# Patient Record
Sex: MALE | Race: WHITE | NOT HISPANIC OR LATINO | Employment: OTHER | URBAN - METROPOLITAN AREA
[De-identification: names, ages, dates, MRNs, and addresses within clinical notes are randomized per-mention and may not be internally consistent; named-entity substitution may affect disease eponyms.]

---

## 2017-06-14 ENCOUNTER — TELEPHONE (OUTPATIENT)
Dept: DERMATOLOGY | Facility: CLINIC | Age: 82
End: 2017-06-14

## 2017-06-14 NOTE — TELEPHONE ENCOUNTER
----- Message from Savannah Novoa sent at 6/14/2017 11:52 AM CDT -----  Contact: patient wife  [phone] shannen [phone] call this number patient wants sooner appointment thanks

## 2017-07-14 ENCOUNTER — TELEPHONE (OUTPATIENT)
Dept: DERMATOLOGY | Facility: CLINIC | Age: 82
End: 2017-07-14

## 2017-07-14 ENCOUNTER — OFFICE VISIT (OUTPATIENT)
Dept: DERMATOLOGY | Facility: CLINIC | Age: 82
End: 2017-07-14
Payer: MEDICARE

## 2017-07-14 VITALS — BODY MASS INDEX: 28.29 KG/M2 | WEIGHT: 170 LBS

## 2017-07-14 DIAGNOSIS — Z85.828 HISTORY OF SKIN CANCER: ICD-10-CM

## 2017-07-14 DIAGNOSIS — D09.9 SQUAMOUS CELL CARCINOMA IN SITU: ICD-10-CM

## 2017-07-14 DIAGNOSIS — D48.5 NEOPLASM OF UNCERTAIN BEHAVIOR OF SKIN: ICD-10-CM

## 2017-07-14 DIAGNOSIS — L57.0 ACTINIC KERATOSIS: Primary | ICD-10-CM

## 2017-07-14 PROCEDURE — 99213 OFFICE O/P EST LOW 20 MIN: CPT | Mod: PBBFAC,PO | Performed by: DERMATOLOGY

## 2017-07-14 PROCEDURE — 1159F MED LIST DOCD IN RCRD: CPT | Mod: ,,, | Performed by: DERMATOLOGY

## 2017-07-14 PROCEDURE — 99999 PR PBB SHADOW E&M-EST. PATIENT-LVL III: CPT | Mod: PBBFAC,,, | Performed by: DERMATOLOGY

## 2017-07-14 PROCEDURE — 11100 PR BIOPSY OF SKIN LESION: CPT | Mod: S$PBB,,, | Performed by: DERMATOLOGY

## 2017-07-14 PROCEDURE — 99213 OFFICE O/P EST LOW 20 MIN: CPT | Mod: 25,S$PBB,, | Performed by: DERMATOLOGY

## 2017-07-14 PROCEDURE — 11100 PR BIOPSY OF SKIN LESION: CPT | Mod: PBBFAC,PO | Performed by: DERMATOLOGY

## 2017-07-14 PROCEDURE — 88305 TISSUE EXAM BY PATHOLOGIST: CPT | Performed by: PATHOLOGY

## 2017-07-14 NOTE — TELEPHONE ENCOUNTER
----- Message from Melissa Garcia sent at 7/14/2017 10:30 AM CDT -----  Contact: pts /Kayla SCOTT-pt- Kayla pts  is calling to speak with the nurse pt needs to be seen before 8-8 pt has a rash that's itching pt isn't comfortable pt needs to be seen asap can you please call Kayla  [phone]    PATRICIA

## 2017-07-15 NOTE — PROGRESS NOTES
Subjective:       Patient ID:  Franklin Perez is a 87 y.o. male who presents for   Chief Complaint   Patient presents with    Lesion     scalp     See previous notes had bx of scc mid scalp used efudex helped some then had PDT following tazorac cream, was much improved recently noted the area bleeding again not painful.   Also new lesion on right cheek.       Lesion         Review of Systems   Constitutional: Negative for fever.   Skin: Negative for itching and rash.   Hematologic/Lymphatic: Does not bruise/bleed easily.        Objective:    Physical Exam   Constitutional: He appears well-developed and well-nourished. No distress.   Neurological: He is alert and oriented to person, place, and time. He is not disoriented.   Psychiatric: He has a normal mood and affect.   Skin:   Areas Examined (abnormalities noted in diagram):   Scalp / Hair Palpated and Inspected  Head / Face Inspection Performed  Neck Inspection Performed  Chest / Axilla Inspection Performed  RUE Inspected  LUE Inspection Performed                   Diagram Legend     Erythematous scaling macule/papule c/w actinic keratosis         See annotation      Assessment / Plan:      Pathology Orders:      Normal Orders This Visit    Tissue Specimen To Pathology, Dermatology     Questions:    Directional Terms:  Other(comment)    Clinical information:  scc    Specific Site:  mid scalp        Actinic keratosis   Cryosurgery Procedure Note    Verbal consent from the patient is obtained and the patient is aware of the precancerous quality and need for treatment of these lesions. Liquid nitrogen cryosurgery is applied to the 1 actinic keratoses, as detailed in the physical exam, to produce a freeze injury.      Neoplasm of uncertain behavior of skin  -     Tissue Specimen To Pathology, Dermatology  Mid scalp  Shave biopsy performed after verbal consent including risk of infection, scar, recurrence, need for additional treatment of site. . Hemostasis achieved  with monsels. No complications. Dressing applied. Wound care explained. Will need further rx if + ca  Same area as previous photo  History of skin cancer  [facility]             Return in about 3 months (around 10/14/2017).

## 2017-07-31 ENCOUNTER — TELEPHONE (OUTPATIENT)
Dept: DERMATOLOGY | Facility: CLINIC | Age: 82
End: 2017-07-31

## 2017-07-31 NOTE — TELEPHONE ENCOUNTER
----- Message from Allyssa Damaris sent at 7/31/2017  9:25 AM CDT -----  Contact: Kayla at [phone] .  [facility] ptp-pts  is returning a call from last Thursday.  Needs another appt.  States that [facility] wants to see him again.  Please call asap.

## 2017-08-02 ENCOUNTER — INITIAL CONSULT (OUTPATIENT)
Dept: DERMATOLOGY | Facility: CLINIC | Age: 82
End: 2017-08-02
Payer: MEDICARE

## 2017-08-02 VITALS
HEART RATE: 72 BPM | DIASTOLIC BLOOD PRESSURE: 64 MMHG | BODY MASS INDEX: 28.32 KG/M2 | WEIGHT: 170 LBS | HEIGHT: 65 IN | SYSTOLIC BLOOD PRESSURE: 125 MMHG

## 2017-08-02 DIAGNOSIS — C44.42 SQUAMOUS CELL CARCINOMA, SCALP/NECK: Primary | ICD-10-CM

## 2017-08-02 PROCEDURE — 99214 OFFICE O/P EST MOD 30 MIN: CPT | Mod: S$PBB,,, | Performed by: DERMATOLOGY

## 2017-08-02 PROCEDURE — 1159F MED LIST DOCD IN RCRD: CPT | Mod: ,,, | Performed by: DERMATOLOGY

## 2017-08-02 PROCEDURE — 99999 PR PBB SHADOW E&M-EST. PATIENT-LVL III: CPT | Mod: PBBFAC,,, | Performed by: DERMATOLOGY

## 2017-08-02 PROCEDURE — 99213 OFFICE O/P EST LOW 20 MIN: CPT | Mod: PBBFAC | Performed by: DERMATOLOGY

## 2017-08-02 PROCEDURE — 1126F AMNT PAIN NOTED NONE PRSNT: CPT | Mod: ,,, | Performed by: DERMATOLOGY

## 2017-08-02 RX ORDER — AMOXICILLIN 500 MG
2 CAPSULE ORAL DAILY
COMMUNITY

## 2017-08-02 NOTE — PROGRESS NOTES
REFERRING MD:  Laure Cleary M.D.    CHIEF COMPLAINT:  Established patient being consulted for Mohs' surgery evaluation.    HISTORY OF PRESENT ILLNESS:  87 y.o. male presents with a 1-2 year(s) history of growth on the mid scalp. (+) painful. Treated scalp with Efudex and PDT in past and this has remained.     Positive for scabbing.  Positive for crusting.  Negative for bleeding.  Positive for itching.    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       Pacemaker: No  Defibrillator: No  Artificial joints: No  Artificial heart valves: No    PAST MEDICAL HISTORY:  Past Medical History:   Diagnosis Date    AK (actinic keratosis)     Anxiety     Basal cell carcinoma     Depression     Hypertension     Prostate cancer     Squamous cell carcinoma     on the right forehead       PAST SURGICAL HISTORY:  Past Surgical History:   Procedure Laterality Date    PROSTATECTOMY          SOCIAL HISTORY:  Dependencies:  never smoked    PERTINENT MEDICATIONS:  See medications list.  aspirin and fish oil    ALLERGIES:  Review of patient's allergies indicates no known allergies.    ROS:  Skin: See HPI  Constitutional: No fatigue, fever, malaise, weight loss, or night sweats.  Cardiovascular: No chest pain, palpitations, or edema.  Respiratory: No coughing, wheezing, SOB, or sputum production.    Physical Exam   HENT:   Head:             General: Mood and affect normal. Alert and orient X3. Normal appearance.  Scalp: Mid scalp with an 8 x 13 mm crusted plaque located 14.5 cm superiorly from the left superior ear attachment.   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    PLAN:  The diagnosis and the pathology report were discussed in detail with the patient. Treatment options were reviewed, including Mohs Micrographic Surgery, radiation, topical therapy, and  standard excision.  After careful review of patient's history and physical exam, and after discussion of treatment options, the decision was made to perform Mohs micrographic surgery.    Scheduled patient for Mohs Micrographic Surgery. Risks, benefits, and alternatives of Mohs' surgery discussed with the patient. Discussed repair options including complex closure, skin flap, skin graft and second intention healing with the patient. Pre-operative instructions provided to the patient. Okay to stay on aspirin. Stop fish oil a week prior to procedure.       Spent 30 minutes in coordination of care and/or consultation with patient discussing diagnosis, treatment options, risks and benefits of each. All questions answered.

## 2017-08-17 ENCOUNTER — PROCEDURE VISIT (OUTPATIENT)
Dept: DERMATOLOGY | Facility: CLINIC | Age: 82
End: 2017-08-17
Payer: MEDICARE

## 2017-08-17 VITALS
DIASTOLIC BLOOD PRESSURE: 77 MMHG | SYSTOLIC BLOOD PRESSURE: 143 MMHG | HEART RATE: 66 BPM | BODY MASS INDEX: 28.32 KG/M2 | HEIGHT: 65 IN | WEIGHT: 170 LBS

## 2017-08-17 DIAGNOSIS — D04.4 SQUAMOUS CELL CARCINOMA IN SITU OF SCALP: Primary | ICD-10-CM

## 2017-08-17 PROCEDURE — 17311 MOHS 1 STAGE H/N/HF/G: CPT | Mod: S$PBB,,, | Performed by: DERMATOLOGY

## 2017-08-17 PROCEDURE — 99499 UNLISTED E&M SERVICE: CPT | Mod: S$PBB,,, | Performed by: DERMATOLOGY

## 2017-08-17 PROCEDURE — 17312 MOHS ADDL STAGE: CPT | Mod: S$PBB,,, | Performed by: DERMATOLOGY

## 2017-08-17 PROCEDURE — 17312 MOHS ADDL STAGE: CPT | Mod: PBBFAC | Performed by: DERMATOLOGY

## 2017-08-17 PROCEDURE — 17311 MOHS 1 STAGE H/N/HF/G: CPT | Mod: PBBFAC | Performed by: DERMATOLOGY

## 2017-08-17 NOTE — PROGRESS NOTES
PROCEDURE: Mohs' Micrographic Surgery    INDICATION: Biopsy-proven skin cancer of cosmetically and functionally important areas, including head, neck, genital, hand, foot, or areas known for having difficulty in healing, such as the lower anterior legs. Tumors with aggressive clinical behavior (rapidly growing, greater than 1 cm in diameter). Tumor with ill-defined borders.    REFERRING MD: Laure Cleary M.D.    CASE NUMBER:     ANESTHETIC: 7 cc 0.5% Lidocaine with Epi 1:200,000 mixed 1:1 with 0.5% Bupivacaine    SURGICAL PREP: Hibiclens    SURGEON: Lion Fernandez MD    ASSISTANTS: Jud Billingsley, Surg Tech    PREOPERATIVE DIAGNOSIS: squamous cell carcinoma in situ    POSTOPERATIVE DIAGNOSIS: squamous cell carcinoma in situ    PATHOLOGIC DIAGNOSIS: squamous cell carcinoma in situ    HISTOLOGY OF SPECIMENS IN FIRST STAGE:   Tumor Type: Tumor seen. Squamous cell carcinoma in situ: Epidermis with full-thickness atypia and variable epidermal maturation.  Depth of Invasion: epidermis  Perineural Invasion: No    HISTOLOGY OF SPECIMENS IN SUBSEQUENT STAGES:  · Tumor Type: No tumor seen.    STAGES OF MOHS' SURGERY PERFORMED: 2    TUMOR-FREE PLANE ACHIEVED: Yes    HEMOSTASIS: electrocoagulation     SPECIMENS: 4 (2 in stage A and 2 in stage B)    LOCATION: mid scalp. Patient verified location.    INITIAL LESION SIZE: 1.0 x 1.5 cm    FINAL DEFECT SIZE: 1.7 x 2.0 cm    WOUND REPAIR/DISPOSITION: When the tumor was completely removed, repair options were discussed with the patient, and it was decided to let the wound heal by second intention. The patient tolerated the procedure well and will consider delayed reconstruction or repair if necessary.    The area was cleaned and dressed appropriately, and the patient was given wound care instructions, as well as an appointment for follow-up evaluation.    Vitals:    08/17/17 0747 08/17/17 1022   BP: (!) 134/59 (!) 143/77   BP Location: Right arm Left arm   Patient Position:  "Sitting Sitting   BP Method: Medium (Automatic) Medium (Automatic)   Pulse: 80 66   Weight: 77.1 kg (170 lb)    Height: 5' 5" (1.651 m)            "

## 2017-09-18 ENCOUNTER — OFFICE VISIT (OUTPATIENT)
Dept: DERMATOLOGY | Facility: CLINIC | Age: 82
End: 2017-09-18
Payer: MEDICARE

## 2017-09-18 DIAGNOSIS — D04.4 SQUAMOUS CELL CARCINOMA IN SITU OF SCALP: Primary | ICD-10-CM

## 2017-09-18 PROCEDURE — 1126F AMNT PAIN NOTED NONE PRSNT: CPT | Mod: ,,, | Performed by: DERMATOLOGY

## 2017-09-18 PROCEDURE — 99212 OFFICE O/P EST SF 10 MIN: CPT | Mod: S$PBB,,, | Performed by: DERMATOLOGY

## 2017-09-18 PROCEDURE — 99999 PR PBB SHADOW E&M-EST. PATIENT-LVL II: CPT | Mod: PBBFAC,,, | Performed by: DERMATOLOGY

## 2017-09-18 PROCEDURE — 1159F MED LIST DOCD IN RCRD: CPT | Mod: ,,, | Performed by: DERMATOLOGY

## 2017-09-18 PROCEDURE — 99212 OFFICE O/P EST SF 10 MIN: CPT | Mod: PBBFAC | Performed by: DERMATOLOGY

## 2017-09-18 NOTE — PROGRESS NOTES
87 y.o. male patient is here for wound check after surgery.    Patient reports no problems.    WOUND PE:  The mid scalp wound with 95% re-epithelialization.    IMPRESSION:  Healing operative site from Mohs' surgery SCC, mid scalp s/p Mohs' with 2nd intention healing, postop week # 5, now almost all healed in.     PLAN:   Keep moist with Auqaphor x 1 more week. No need to bandage.   Daily SPF.  Regular skin checks.    RTC:  In 3-6 months with Laure Cleary M.D. for skin check or sooner if new concern arises.

## 2017-09-29 ENCOUNTER — OFFICE VISIT (OUTPATIENT)
Dept: DERMATOLOGY | Facility: CLINIC | Age: 82
End: 2017-09-29
Payer: MEDICARE

## 2017-09-29 VITALS — BODY MASS INDEX: 28.29 KG/M2 | WEIGHT: 170 LBS

## 2017-09-29 DIAGNOSIS — Z85.828 HISTORY OF SKIN CANCER: ICD-10-CM

## 2017-09-29 DIAGNOSIS — L57.0 MULTIPLE ACTINIC KERATOSES: Primary | ICD-10-CM

## 2017-09-29 DIAGNOSIS — L82.1 SEBORRHEIC KERATOSES: ICD-10-CM

## 2017-09-29 PROCEDURE — 1159F MED LIST DOCD IN RCRD: CPT | Mod: ,,, | Performed by: DERMATOLOGY

## 2017-09-29 PROCEDURE — 99212 OFFICE O/P EST SF 10 MIN: CPT | Mod: PBBFAC,PO | Performed by: DERMATOLOGY

## 2017-09-29 PROCEDURE — 17003 DESTRUCT PREMALG LES 2-14: CPT | Mod: S$PBB,,, | Performed by: DERMATOLOGY

## 2017-09-29 PROCEDURE — 99213 OFFICE O/P EST LOW 20 MIN: CPT | Mod: 25,S$PBB,, | Performed by: DERMATOLOGY

## 2017-09-29 PROCEDURE — 17000 DESTRUCT PREMALG LESION: CPT | Mod: S$PBB,,, | Performed by: DERMATOLOGY

## 2017-09-29 PROCEDURE — 99999 PR PBB SHADOW E&M-EST. PATIENT-LVL II: CPT | Mod: PBBFAC,,, | Performed by: DERMATOLOGY

## 2017-09-29 PROCEDURE — 17000 DESTRUCT PREMALG LESION: CPT | Mod: PBBFAC,PO | Performed by: DERMATOLOGY

## 2017-09-29 PROCEDURE — 1126F AMNT PAIN NOTED NONE PRSNT: CPT | Mod: ,,, | Performed by: DERMATOLOGY

## 2017-09-29 PROCEDURE — 17003 DESTRUCT PREMALG LES 2-14: CPT | Mod: PBBFAC,PO | Performed by: DERMATOLOGY

## 2017-09-29 NOTE — PROGRESS NOTES
Subjective:       Patient ID:  Franklin Perez is a 87 y.o. male who presents for   Chief Complaint   Patient presents with    Follow-up     SCC  on scalp     Lesion     History of Present Illness: The patient presents with chief complaint of spots.  Location: post scalp  Duration: weeks  Signs/Symptoms: none    Prior treatments: none              Review of Systems   Constitutional: Negative for fever.   Skin: Negative for itching and rash.   Hematologic/Lymphatic: Does not bruise/bleed easily.        Objective:    Physical Exam   Constitutional: He appears well-developed and well-nourished. No distress.   Neurological: He is alert and oriented to person, place, and time. He is not disoriented.   Psychiatric: He has a normal mood and affect.   Skin:   Areas Examined (abnormalities noted in diagram):   Scalp / Hair Palpated and Inspected  Head / Face Inspection Performed  Neck Inspection Performed  Chest / Axilla Inspection Performed  RUE Inspected  LUE Inspection Performed                   Diagram Legend     Erythematous scaling macule/papule c/w actinic keratosis         Pigmented verrucoid papule/plaque c/w seborrheic keratosis        Surgical scar with no sign of skin cancer recurrence       Assessment / Plan:        Multiple actinic keratoses   Cryosurgery Procedure Note    Verbal consent from the patient is obtained and the patient is aware of the precancerous quality and need for treatment of these lesions. Liquid nitrogen cryosurgery is applied to the 7 actinic keratoses, as detailed in the physical exam, to produce a freeze injury.      History of skin cancer  [facility] most recently on scalp see notes Dr Fernandez    Seborrheic keratoses  reassurance               Return in about 3 months (around 12/29/2017).

## 2017-12-05 ENCOUNTER — OFFICE VISIT (OUTPATIENT)
Dept: DERMATOLOGY | Facility: CLINIC | Age: 82
End: 2017-12-05
Payer: MEDICARE

## 2017-12-05 VITALS — WEIGHT: 170 LBS | BODY MASS INDEX: 28.29 KG/M2

## 2017-12-05 DIAGNOSIS — Z85.828 HISTORY OF SKIN CANCER: ICD-10-CM

## 2017-12-05 DIAGNOSIS — D48.5 NEOPLASM OF UNCERTAIN BEHAVIOR OF SKIN: ICD-10-CM

## 2017-12-05 DIAGNOSIS — L08.0 PYODERMA: Primary | ICD-10-CM

## 2017-12-05 PROCEDURE — 11100 PR BIOPSY OF SKIN LESION: CPT | Mod: S$PBB,,, | Performed by: DERMATOLOGY

## 2017-12-05 PROCEDURE — 99213 OFFICE O/P EST LOW 20 MIN: CPT | Mod: 25,S$PBB,, | Performed by: DERMATOLOGY

## 2017-12-05 PROCEDURE — 99999 PR PBB SHADOW E&M-EST. PATIENT-LVL III: CPT | Mod: PBBFAC,,, | Performed by: DERMATOLOGY

## 2017-12-05 PROCEDURE — 99213 OFFICE O/P EST LOW 20 MIN: CPT | Mod: PBBFAC,PO | Performed by: DERMATOLOGY

## 2017-12-05 PROCEDURE — 87070 CULTURE OTHR SPECIMN AEROBIC: CPT

## 2017-12-05 PROCEDURE — 11100 PR BIOPSY OF SKIN LESION: CPT | Mod: PBBFAC,PO | Performed by: DERMATOLOGY

## 2017-12-05 PROCEDURE — 88305 TISSUE EXAM BY PATHOLOGIST: CPT | Performed by: PATHOLOGY

## 2017-12-05 PROCEDURE — 88342 IMHCHEM/IMCYTCHM 1ST ANTB: CPT | Mod: 26,,, | Performed by: PATHOLOGY

## 2017-12-05 RX ORDER — ALISKIREN HEMIFUMARATE 150 MG/1
150 TABLET, FILM COATED ORAL 2 TIMES DAILY
COMMUNITY
Start: 2017-12-04

## 2017-12-05 RX ORDER — TRAMADOL HYDROCHLORIDE 50 MG/1
TABLET ORAL
COMMUNITY
Start: 2017-11-16

## 2017-12-05 RX ORDER — MECLIZINE HYDROCHLORIDE 25 MG/1
TABLET ORAL
COMMUNITY
Start: 2017-12-01

## 2017-12-07 LAB — BACTERIA SPEC AEROBE CULT: NORMAL

## 2018-08-27 ENCOUNTER — OFFICE VISIT (OUTPATIENT)
Dept: DERMATOLOGY | Facility: CLINIC | Age: 83
End: 2018-08-27
Payer: MEDICARE

## 2018-08-27 VITALS — BODY MASS INDEX: 28.29 KG/M2 | WEIGHT: 170 LBS

## 2018-08-27 DIAGNOSIS — D48.5 NEOPLASM OF UNCERTAIN BEHAVIOR OF SKIN: Primary | ICD-10-CM

## 2018-08-27 PROCEDURE — 11100 PR BIOPSY OF SKIN LESION: CPT | Mod: S$PBB,,, | Performed by: DERMATOLOGY

## 2018-08-27 PROCEDURE — 99999 PR PBB SHADOW E&M-EST. PATIENT-LVL III: CPT | Mod: PBBFAC,,, | Performed by: DERMATOLOGY

## 2018-08-27 PROCEDURE — 99213 OFFICE O/P EST LOW 20 MIN: CPT | Mod: 25,S$PBB,, | Performed by: DERMATOLOGY

## 2018-08-27 PROCEDURE — 11101 PR BIOPSY, EACH ADDED LESION: CPT | Mod: PBBFAC,PO | Performed by: DERMATOLOGY

## 2018-08-27 PROCEDURE — 88312 SPECIAL STAINS GROUP 1: CPT | Mod: 26,,, | Performed by: PATHOLOGY

## 2018-08-27 PROCEDURE — 11100 PR BIOPSY OF SKIN LESION: CPT | Mod: PBBFAC,PO | Performed by: DERMATOLOGY

## 2018-08-27 PROCEDURE — 88342 IMHCHEM/IMCYTCHM 1ST ANTB: CPT | Mod: 26,,, | Performed by: PATHOLOGY

## 2018-08-27 PROCEDURE — 99213 OFFICE O/P EST LOW 20 MIN: CPT | Mod: PBBFAC,PO,25 | Performed by: DERMATOLOGY

## 2018-08-27 PROCEDURE — 11101 PR BIOPSY, EACH ADDED LESION: CPT | Mod: S$PBB,,, | Performed by: DERMATOLOGY

## 2018-08-27 PROCEDURE — 88305 TISSUE EXAM BY PATHOLOGIST: CPT | Mod: 59 | Performed by: PATHOLOGY

## 2018-08-27 NOTE — PROGRESS NOTES
Subjective:       Patient ID:  Franklin Perez is a 88 y.o. male who presents for   Chief Complaint   Patient presents with    Follow-up     scalp     History of Present Illness: The patient presents with chief complaint of spots.  Location: scalp  Duration: several months  Signs/Symptoms: crusted    Prior treatments: none              Review of Systems   Constitutional: Negative for fever.   Skin: Negative for itching and rash.   Hematologic/Lymphatic: Does not bruise/bleed easily.        Objective:    Physical Exam   Constitutional: He appears well-developed and well-nourished. No distress.   Neurological: He is alert and oriented to person, place, and time. He is not disoriented.   Psychiatric: He has a normal mood and affect.   Skin:   Areas Examined (abnormalities noted in diagram):   Scalp / Hair Palpated and Inspected  Head / Face Inspection Performed  Neck Inspection Performed  Chest / Axilla Inspection Performed  RUE Inspected  LUE Inspection Performed                   Diagram Legend     Erythematous scaling macule/papule c/w actinic keratosis       Vascular papule c/w angioma      Pigmented verrucoid papule/plaque c/w seborrheic keratosis      Yellow umbilicated papule c/w sebaceous hyperplasia      Irregularly shaped tan macule c/w lentigo     1-2 mm smooth white papules consistent with Milia      Movable subcutaneous cyst with punctum c/w epidermal inclusion cyst      Subcutaneous movable cyst c/w pilar cyst      Firm pink to brown papule c/w dermatofibroma      Pedunculated fleshy papule(s) c/w skin tag(s)      Evenly pigmented macule c/w junctional nevus     Mildly variegated pigmented, slightly irregular-bordered macule c/w mildly atypical nevus      Flesh colored to evenly pigmented papule c/w intradermal nevus       Pink pearly papule/plaque c/w basal cell carcinoma      Erythematous hyperkeratotic cursted plaque c/w SCC      Surgical scar with no sign of skin cancer recurrence      Open and  closed comedones      Inflammatory papules and pustules      Verrucoid papule consistent consistent with wart     Erythematous eczematous patches and plaques     Dystrophic onycholytic nail with subungual debris c/w onychomycosis     Umbilicated papule    Erythematous-base heme-crusted tan verrucoid plaque consistent with inflamed seborrheic keratosis     Erythematous Silvery Scaling Plaque c/w Psoriasis     See annotation      Assessment / Plan:      Pathology Orders:     Normal Orders This Visit    Tissue Specimen To Pathology, Dermatology     Questions:    Directional Terms:  Other(comment)    Clinical information:  actinic keratosis    Specific Site:  anterior scalp    Tissue Specimen To Pathology, Dermatology     Questions:    Directional Terms:  Other(comment)    Clinical information:  actinic keratosis    Specific Site:  post scalp        Neoplasm of uncertain behavior of skin  -     Tissue Specimen To Pathology, Dermatology  -     Tissue Specimen To Pathology, Dermatology  Anterior scalp  Shave biopsy performed after verbal consent including risk of infection, scar, recurrence, need for additional treatment of site. Area prepped with alcohol, anesthetized with 1% lidocaine with epinephrine. . Hemostasis achieved with  monsels. No complications. Dressing applied. Wound care explained. Will need further rx if + ca        Shave biopsy performed after verbal consent including risk of infection, scar, recurrence, need for additional treatment of site. Area prepped with alcohol, anesthetized with  1% lidocaine with epinephrine. . Hemostasis achieved with monsels. No complications. Dressing applied. Wound care explained. Will need further rx if + ca    Also has hyperkeratosis of scalp, will try neutrogena sal shampoo.         Follow-up in about 6 months (around 2/27/2019).

## 2018-09-13 ENCOUNTER — TELEPHONE (OUTPATIENT)
Dept: DERMATOLOGY | Facility: CLINIC | Age: 83
End: 2018-09-13

## 2018-09-20 ENCOUNTER — INITIAL CONSULT (OUTPATIENT)
Dept: DERMATOLOGY | Facility: CLINIC | Age: 83
End: 2018-09-20
Payer: MEDICARE

## 2018-09-20 VITALS — HEART RATE: 79 BPM | SYSTOLIC BLOOD PRESSURE: 120 MMHG | DIASTOLIC BLOOD PRESSURE: 62 MMHG

## 2018-09-20 DIAGNOSIS — C44.41 BASAL CELL CARCINOMA OF SCALP: Primary | ICD-10-CM

## 2018-09-20 PROCEDURE — 99214 OFFICE O/P EST MOD 30 MIN: CPT | Mod: S$PBB,,, | Performed by: DERMATOLOGY

## 2018-09-20 PROCEDURE — 99999 PR PBB SHADOW E&M-EST. PATIENT-LVL III: CPT | Mod: PBBFAC,,, | Performed by: DERMATOLOGY

## 2018-09-20 PROCEDURE — 99213 OFFICE O/P EST LOW 20 MIN: CPT | Mod: PBBFAC | Performed by: DERMATOLOGY

## 2018-09-20 NOTE — LETTER
September 20, 2018      Laure Cleary MD  [address] [address]           [facility]  [address]  [facility] [address]  Phone: [phone]  Fax: [phone]          Patient: Franklin Perez   MR Number: [id number]   YOB: 1930   Date of Visit: 9/20/2018       Dear Dr. Laure Cleary:    Thank you for referring Franklin Perez to me for evaluation. Attached you will find relevant portions of my assessment and plan of care.    If you have questions, please do not hesitate to call me. I look forward to following Franklin Perez along with you.    Sincerely,    Sean Mcdaniel MD    Enclosure  CC:  No Recipients    If you would like to receive this communication electronically, please contact [email or web address] or [phone] to request more information on [employer] access.    For providers and/or their staff who would like to refer a patient to [facility], please contact us through our one-stop-shop provider referral line, [facility], at [phone].    If you feel you have received this communication in error or would no longer like to receive these types of communications, please e-mail [email or web address]

## 2018-09-20 NOTE — PROGRESS NOTES
ALLERGIES:  Patient has no known allergies.    CHIEF COMPLAINT:  This 88 y.o. male comes for evaluation for Mohs' Micrographic Surgery, Fresh Tissue Technique, for treatment of a biopsy-proven basal cell carcinoma on the posterior scalp. Consultation requested by Laure Cleary MD.    The patient is accompanied to this visit by his daughter.    HISTORY OF PRESENT ILLNESS:   Location: posterior scalp  Duration: unknown  Quality: possibly recurrent  Context: status post biopsy by Laure Cleary MD; path = basal cell carcinoma; pathology accession #[id number], [facility]     Prior Treatment: unknown  See also the handwritten notes/diagrams scanned to chart for additional details.    Defibrillator: No  Pacemaker: No  Artificial heart valves: No  Artificial joints: No    REVIEW OF SYSTEMS:   General: general health good  Skin: has previous history of skin cancer(s)  CV: has hypertension, no artificial valves, has no chest pain  Resp: has shortness of breath  Endo: has no diabetes  Hem/Lymph: taking prescribed anticoagulants-ASPIRIN, has no easy bruising/bleeding  Allergy/Immuno: has no allergies as noted above  GI: has no history of hepatitis  MS: as noted above     PAST MEDICAL HISTORY:  Past Medical History:   Diagnosis Date    AK (actinic keratosis)     Anxiety     Basal cell carcinoma     Depression     Hypertension     Prostate cancer     Squamous cell carcinoma     on the right forehead       PAST SURGICAL HISTORY:  Past Surgical History:   Procedure Laterality Date    PROSTATECTOMY          SOCIAL HISTORY:  Dependencies: smoking status as noted below  Social History     Tobacco Use    Smoking status: Never Smoker    Smokeless tobacco: Never Used   Substance Use Topics    Alcohol use: Not on file    Drug use: Not on file       PERTINENT MEDICATIONS:  See medications list    Current Outpatient Medications:     aspirin (ECOTRIN) 81 MG EC tablet, Take 81 mg by mouth once daily., Disp: , Rfl:      BYSTOLIC 10 mg Tab, , Disp: , Rfl: 0    dicyclomine (BENTYL) 20 mg tablet, , Disp: , Rfl: 0    fish oil-omega-3 fatty acids 300-1,000 mg capsule, Take 2 g by mouth once daily., Disp: , Rfl:     ginkgo biloba 60 mg Tab, Take by mouth., Disp: , Rfl:     hydrALAZINE (APRESOLINE) 50 MG tablet, , Disp: , Rfl:     lorazepam (ATIVAN) 0.5 MG tablet, , Disp: , Rfl:     meclizine (ANTIVERT) 25 mg tablet, , Disp: , Rfl:     multivitamin (THERAGRAN) tablet, Take 1 tablet by mouth once daily., Disp: , Rfl:     multivitamin capsule, Take 1 capsule by mouth once daily., Disp: , Rfl:     spironolactone (ALDACTONE) 50 MG tablet, , Disp: , Rfl:     TEKTURNA 150 mg Tab, , Disp: , Rfl:     TEKTURNA 300 mg Tab, , Disp: , Rfl:     timolol maleate 0.5% (TIMOPTIC-XE) 0.5 % SolG, Place 1 drop into both eyes nightly., Disp: , Rfl: 0    traMADol (ULTRAM) 50 mg tablet, , Disp: , Rfl:     tazarotene (TAZORAC) 0.05 % Crea cream, Apply topically once daily. Use on scalp for 2 days before treatment with light( PDT), Disp: 30 g, Rfl: 3    ALLERGIES:  Patient has no known allergies.    EXAM:  See also the handwritten notes/diagrams scanned to chart for additional details.  Constitutional  General appearance: well-developed, well-nourished, well-kempt older white male    Eyes  Inspection of conjunctivae and lids reveals no abnormalities; sclerae anicteric  Neurologic/Psychiatric  Alert,  normal orientation to time, place, person  Normal mood and affect with no evidence of depression, anxiety, agitation  Skin: see photo(s)  Head: background marked solar damage to exposed areas of skin; in addition, inspection/palpation reveals an approximately 1.5 cm eroded plaque on the left posterior scalp; site(s) confirmed by reference to the photograph(s) in the chart taken at the time of the biopsy/biopsies by the referring physician and he confirmed this as the site of the prior biopsy  Neck: examination reveals marked chronic solar damage  Right  upper extremity: examination reveals marked chronic solar damage  Left upper extremity: examination reveals marked chronic solar damage    ASSESSMENT: biopsy-proven basal cell carcinoma of the left posterior scal  chronic solar damage to areas as noted above  personal history of non-melanoma skin cancer  Long term current use of aspirin    PLAN:  The diagnosis and management options, and risks and benefits of the alternatives, including observation/non-treatment, radiation treatment, excision with vertical frozen section or paraffin-embedded section margin evaluation, and Mohs' Micrographic Surgery, Fresh Tissue Technique, were discussed at length with the patient. In particular, the discussion included, but was not limited to, the following:    One alternative at this point would be to defer further treatment and observe the lesion. With small skin cancers of this kind, it is possible that a biopsy can be sufficient to definitively treat a small skin cancer of this kind. Alternatively, some skin cancers are slow growing and do not require immediate treatment. The potential advantage of this choice would be to avoid the need for possibly unnecessary additional surgery. Among the potential disadvantages of this would be the possibility of enlargement of the lesion, more extensive spread of the lesion or recurrence at a later date, which might necessitate a larger and more complex surgery.    Radiation treatment can be an effective treatment for this type of skin cancer. The usual course of treatment is every weekday for several weeks. Local irritation will result from treatment, although no systemic side effects are expected. The potential advantage of radiation treatment is that it avoids the need for surgery. Among the disadvantages of radiation treatment are the length of treatment, the local inflammatory response, the absence of pathologic confirmation of the removal of the skin cancer, a possible increased risk  of additional skin cancer in the treated area in later years, and a somewhat increased risk of recurrence at a later date.     Excisional surgery can be an effective treatment for this type of skin cancer. This would involve excision of the lesion with margin evaluation by submitting the specimen to a pathologist for either immediate marginal assessment via frozen section processing, or delayed marginal assessment by fixed-tissue processing. The potential advantage of this technique is that it offers a way of treating the lesion with some degree of histologic confirmation of tumor removal. Among the disadvantages of this treatment are the possible need for re-excision if marginal involvement is identified, a somewhat greater likelihood of recurrence as compared to Mohs' surgery because of the less comprehensive margin evaluation inherent in the technique, and the general potential risks of surgery, including allergic reactions to the anesthetic and other materials used, infection, injury to nerves in the area with consequent loss of sensation or muscle function, and scarring or distortion of surrounding structures.    Mohs' surgery is a very effective treatment for this type of skin cancer. The potential advantage of Mohs' surgery is that this technique offers the greatest possible certainty of knowing that the skin cancer has been completely removed, with the removal of the least amount of normal tissue. The potential disadvantages of Mohs' surgery include the duration of the surgery, the possible need for a separate surgery for reconstruction following tumor removal, and scarring as a result. In addition, general potential risks of surgery as noted above also apply to treatment via Mohs' surgery.    In light of the nature of this tumor and the location on the scalp in an area of increased risk of recurrence,  Mohs' micrographic surgery was thought to be the most appropriate management choice, and this diagnosis is  appropriate for treatment by Mohs' micrographic surgery.     Sufficient time was available for questions, and all questions were answered to his satisfaction. He fully understands the aims, risks, alternatives, and possible complications, and has elected to proceed with the surgery, and verbally consented to do so. The procedure will be scheduled in the near future.    Routine pre-op instructions were given to him.    I instructed him to continue ASA prior to surgery.    --------------------------------------  Note: Some or all of this note may have been generated using voice recognition software. There may be voice recognition errors including grammatical and/or spelling errors found in the text. Attempts were made to correct these errors prior to signature.

## 2018-11-10 NOTE — PROGRESS NOTES
ALLERGIES:   Patient has no known allergies.      Current Outpatient Medications:     aspirin (ECOTRIN) 81 MG EC tablet, Take 81 mg by mouth once daily., Disp: , Rfl:     BYSTOLIC 10 mg Tab, , Disp: , Rfl: 0    dicyclomine (BENTYL) 20 mg tablet, , Disp: , Rfl: 0    fish oil-omega-3 fatty acids 300-1,000 mg capsule, Take 2 g by mouth once daily., Disp: , Rfl:     ginkgo biloba 60 mg Tab, Take by mouth., Disp: , Rfl:     hydrALAZINE (APRESOLINE) 50 MG tablet, , Disp: , Rfl:     lorazepam (ATIVAN) 0.5 MG tablet, , Disp: , Rfl:     meclizine (ANTIVERT) 25 mg tablet, , Disp: , Rfl:     multivitamin (THERAGRAN) tablet, Take 1 tablet by mouth once daily., Disp: , Rfl:     multivitamin capsule, Take 1 capsule by mouth once daily., Disp: , Rfl:     spironolactone (ALDACTONE) 50 MG tablet, , Disp: , Rfl:     tazarotene (TAZORAC) 0.05 % Crea cream, Apply topically once daily. Use on scalp for 2 days before treatment with light( PDT), Disp: 30 g, Rfl: 3    TEKTURNA 150 mg Tab, , Disp: , Rfl:     TEKTURNA 300 mg Tab, , Disp: , Rfl:     timolol maleate 0.5% (TIMOPTIC-XE) 0.5 % SolG, Place 1 drop into both eyes nightly., Disp: , Rfl: 0    traMADol (ULTRAM) 50 mg tablet, , Disp: , Rfl:   -------------------------------------------------------------  PROCEDURE: Mohs' Micrographic Surgery    SITE: posterior scalp    INDICATION: basal cell carcinoma in an area at increased risk of recurrence    CASE NUMBER: [id number]      ANESTHETIC: 3.5 mL 1% Lidocaine with Epinephrine 1:100,000    SURGICAL PREP: Ethanol and Hibiclens    SURGEON: Sean Mcdaniel MD    ASSISTANTS: Abdirashid Pringle CST     PREOPERATIVE DIAGNOSIS: basal cell carcinoma    POSTOPERATIVE DIAGNOSIS: basal cell carcinoma    PATHOLOGIC DIAGNOSIS: basal cell carcinoma    STAGES OF MOHS' SURGERY PERFORMED: one    TUMOR-FREE PLANE ACHIEVED: yes    HEMOSTASIS: Hyfrecation     SPECIMENS: two (two in stage A)    INITIAL LESION SIZE: 2.0 x 2.5 cm    FINAL DEFECT  SIZE: 2.0 x 2.5 cm    WOUND REPAIR/DISPOSITION: see below    NARRATIVE:    The patient is a 88 y.o.male referred by Laure Cleary MD with a history of cancer on the left posterior scalp which was biopsied - pathology accession #[id number], [facility]. Findings revealed basal cell carcinoma. Examination revealed an ill-defined, pink, pearly/crusted plaque on the left posterior scalp vertex at the site of prior biopsy, which was confirmed by reference to the photograph taken at the previous patient visit. In light of the nature of this tumor and the location on the scalp, Mohs' micrographic surgery was thought to be the most appropriate management choice, and this diagnosis is appropriate for treatment by Mohs' micrographic surgery.  I discussed it with the patient and he fully understands the aims, risks, alternatives, and possible complications, and elects to proceed.  There are no medical or surgical contraindications to the procedure.     A signed informed consent was obtained.    PROCEDURE:  The patient was placed in the right lateral decubitus position on the operating table in the Mohs' Surgery Suite. The area in question was thoroughly prepped with ethanol and Hibiclens. A sterile surgical marker was used to outline the clinically apparent margins of the involved area, and a narrow margin of normal-appearing skin. Reference marks were made at the periphery of the outlined area with the surgical marker. The proposed area of excision was measured and photographed. Local anesthesia as noted above was administered.  The total volume of anesthetic used throughout this portion of the procedure was as documented above. The area was prepared and draped in the standard manner. All of the grossly identifiable area of clinically abnormal tissue and an underlying/peripheral layer was taken and processed by the Mohs' technique.  Hemostasis was obtained with the hyfrecator. Tissue was taken from any areas of residual  "marginal involvement (if present) and processed by the Mohs' technique in as many stages as needed until a tumor-free plane was achieved.    Colors of inks used in the reference nicks at epidermal margins (if present) and/or inking of non-epithelial edges, if applicable, is represented on the Mohs map as follows: solid lines represent red ink, dots represent blue ink, jagged lines represent black ink, curlicues represent green ink, "xxx" represents yellow ink.    The first Mohs' layer consisted of two section(s) with 4 slide(s) evaluated. No residual tumor was noted at the margins of the first Mohs' layer. Histology of the specimen(s) showed changes consistent with chronic solar damage.    A total of two section(s) and 4 slide(s) were examined under the microscope via the Mohs technique.  A cancer free plane was reached after layer number one. Defect final size was as noted above.      The wound was covered with a nonadherent dressing between stages, and the patient allowed to wait in the waiting area during these periods. The final defect was photographed at the completion of the Mohs' procedure.    The patient was returned to the procedure room following completion of the Mohs' procedure and final slide review. After reviewing the risk and benefits of the alternatives for management of the defect, the patient and I have decided to allow the site to heal by secondary intention.    Final dressing consisted of petrolatum, Telfa, gauze and tape.    Estimated blood loss for the total procedure was less than 5 mL.    Total operative time including tissue processing in the Mohs' laboratory and microscopic Mohs' frozen section slide review was 1 hour(s). Verbal and written wound care instructions were given to the patient, and he expressed understanding of these instructions. The patient tolerated the procedure well and left the operating room in good condition; he is to return in 2 weeks for followup.     Dr. Mcdaniel's " cell phone number was given to the patient with instructions to call prn with any problems.

## 2018-11-12 ENCOUNTER — PROCEDURE VISIT (OUTPATIENT)
Dept: DERMATOLOGY | Facility: CLINIC | Age: 83
End: 2018-11-12
Payer: MEDICARE

## 2018-11-12 VITALS
SYSTOLIC BLOOD PRESSURE: 153 MMHG | HEART RATE: 78 BPM | DIASTOLIC BLOOD PRESSURE: 68 MMHG | HEIGHT: 62 IN | WEIGHT: 170 LBS | BODY MASS INDEX: 31.28 KG/M2

## 2018-11-12 DIAGNOSIS — C44.41 BASAL CELL CARCINOMA OF SCALP: Primary | ICD-10-CM

## 2018-11-12 PROCEDURE — 17311 MOHS 1 STAGE H/N/HF/G: CPT | Mod: S$PBB,,, | Performed by: DERMATOLOGY

## 2018-11-12 PROCEDURE — 99499 UNLISTED E&M SERVICE: CPT | Mod: S$PBB,,, | Performed by: DERMATOLOGY

## 2018-11-12 PROCEDURE — 17311 MOHS 1 STAGE H/N/HF/G: CPT | Mod: PBBFAC | Performed by: DERMATOLOGY

## 2018-11-26 ENCOUNTER — OFFICE VISIT (OUTPATIENT)
Dept: DERMATOLOGY | Facility: CLINIC | Age: 83
End: 2018-11-26
Payer: MEDICARE

## 2018-11-26 DIAGNOSIS — Z85.828 HISTORY OF MOH'S MICROGRAPHIC SURGERY FOR SKIN CANCER: Primary | ICD-10-CM

## 2018-11-26 DIAGNOSIS — Z98.890 HISTORY OF MOH'S MICROGRAPHIC SURGERY FOR SKIN CANCER: Primary | ICD-10-CM

## 2018-11-26 PROCEDURE — 99999 PR PBB SHADOW E&M-EST. PATIENT-LVL II: CPT | Mod: PBBFAC,,, | Performed by: DERMATOLOGY

## 2018-11-26 PROCEDURE — 99024 POSTOP FOLLOW-UP VISIT: CPT | Mod: POP,,, | Performed by: DERMATOLOGY

## 2018-11-26 PROCEDURE — 99212 OFFICE O/P EST SF 10 MIN: CPT | Mod: PBBFAC | Performed by: DERMATOLOGY

## 2018-11-26 NOTE — PROGRESS NOTES
CC: 88 y.o.male patient is here for followup     HPI: Patient is 2 week(s) s/p Mohs' micrographic surgery, fresh tissue technique, of a basal cell carcinoma on the scalp; with healing via secondary intention  Patient reports no problems    EXAM: Wound appears to be healing well. Base shows good granulation.  No undue erythema to surrounding skin or signs or symptoms of infection.    IMPRESSION:  Healing well post Mohs' micrographic surgery via secondary intention    PLAN:  Discussed current findings  Continue current care  Reviewed anticipated course  Followup 4 weeks; call prn sooner

## 2019-01-07 ENCOUNTER — OFFICE VISIT (OUTPATIENT)
Dept: DERMATOLOGY | Facility: CLINIC | Age: 84
End: 2019-01-07
Payer: MEDICARE

## 2019-01-07 DIAGNOSIS — Z98.890 HISTORY OF MOH'S MICROGRAPHIC SURGERY FOR SKIN CANCER: Primary | ICD-10-CM

## 2019-01-07 DIAGNOSIS — Z85.828 HISTORY OF MOH'S MICROGRAPHIC SURGERY FOR SKIN CANCER: Primary | ICD-10-CM

## 2019-01-07 PROCEDURE — 99024 POSTOP FOLLOW-UP VISIT: CPT | Mod: POP,,, | Performed by: DERMATOLOGY

## 2019-01-07 PROCEDURE — 99024 PR POST-OP FOLLOW-UP VISIT: ICD-10-PCS | Mod: POP,,, | Performed by: DERMATOLOGY

## 2019-01-07 PROCEDURE — 99211 OFF/OP EST MAY X REQ PHY/QHP: CPT | Mod: PBBFAC | Performed by: DERMATOLOGY

## 2019-01-07 PROCEDURE — 99999 PR PBB SHADOW E&M-EST. PATIENT-LVL I: CPT | Mod: PBBFAC,,, | Performed by: DERMATOLOGY

## 2019-01-07 PROCEDURE — 99999 PR PBB SHADOW E&M-EST. PATIENT-LVL I: ICD-10-PCS | Mod: PBBFAC,,, | Performed by: DERMATOLOGY

## 2019-01-07 NOTE — PROGRESS NOTES
CC: 88 y.o.male patient is here for followup     HPI: Patient is about 7 week(s) s/p Mohs' micrographic surgery, fresh tissue technique, of a basal cell carcinoma on the posterior scalp; with healing via secondary intention  Patient reports no problems  Continues topical care    ROS:   Also status post biopsy, anterior scalp, by Dr. Cleary in August; path showed no signs of malignancy; see path in chart  Also continues topical care to this site    EXAM: Wound appears to be healed, or almost completely so.  No undue erythema to surrounding skin or signs or symptoms of infection.  Right anterior scalp site is also somewhat erythematous but appears epithelialized.    IMPRESSION:  Healing well post Mohs' micrographic surgery via secondary intention    PLAN:  Site cleaned with peroxide  Dressed with petrolatum and Telfa and tape  Discussed further care and expected course   Okay to discontinue ointment and leave the sites uncovered  Reviewed anticipated course  Followup 2-3 months to Dr. Cleary; PRN to me

## 2019-08-12 ENCOUNTER — TELEPHONE (OUTPATIENT)
Dept: DERMATOLOGY | Facility: CLINIC | Age: 84
End: 2019-08-12

## 2019-08-12 NOTE — TELEPHONE ENCOUNTER
----- Message from Freya Rodriguez sent at 8/12/2019  3:06 PM CDT -----  Contact: Kayla () @ [phone]  Returning Savannah's call.

## 2019-08-20 ENCOUNTER — OFFICE VISIT (OUTPATIENT)
Dept: DERMATOLOGY | Facility: CLINIC | Age: 84
End: 2019-08-20
Payer: MEDICARE

## 2019-08-20 VITALS — BODY MASS INDEX: 31.09 KG/M2 | WEIGHT: 170 LBS

## 2019-08-20 DIAGNOSIS — L57.0 MULTIPLE ACTINIC KERATOSES: Primary | ICD-10-CM

## 2019-08-20 DIAGNOSIS — L82.1 SEBORRHEIC KERATOSES: ICD-10-CM

## 2019-08-20 DIAGNOSIS — Z85.828 HISTORY OF SKIN CANCER: ICD-10-CM

## 2019-08-20 PROCEDURE — 99213 PR OFFICE/OUTPT VISIT, EST, LEVL III, 20-29 MIN: ICD-10-PCS | Mod: 25,S$PBB,, | Performed by: DERMATOLOGY

## 2019-08-20 PROCEDURE — 17003 DESTRUCT PREMALG LES 2-14: CPT | Mod: S$PBB,,, | Performed by: DERMATOLOGY

## 2019-08-20 PROCEDURE — 17003 DESTRUCT PREMALG LES 2-14: CPT | Mod: PBBFAC,PO | Performed by: DERMATOLOGY

## 2019-08-20 PROCEDURE — 99213 OFFICE O/P EST LOW 20 MIN: CPT | Mod: PBBFAC,PO | Performed by: DERMATOLOGY

## 2019-08-20 PROCEDURE — 99999 PR PBB SHADOW E&M-EST. PATIENT-LVL III: ICD-10-PCS | Mod: PBBFAC,,, | Performed by: DERMATOLOGY

## 2019-08-20 PROCEDURE — 99999 PR PBB SHADOW E&M-EST. PATIENT-LVL III: CPT | Mod: PBBFAC,,, | Performed by: DERMATOLOGY

## 2019-08-20 PROCEDURE — 99213 OFFICE O/P EST LOW 20 MIN: CPT | Mod: 25,S$PBB,, | Performed by: DERMATOLOGY

## 2019-08-20 PROCEDURE — 17003 DESTRUCTION, PREMALIGNANT LESIONS; SECOND THROUGH 14 LESIONS: ICD-10-PCS | Mod: S$PBB,,, | Performed by: DERMATOLOGY

## 2019-08-20 PROCEDURE — 17000 PR DESTRUCTION(LASER SURGERY,CRYOSURGERY,CHEMOSURGERY),PREMALIGNANT LESIONS,FIRST LESION: ICD-10-PCS | Mod: S$PBB,,, | Performed by: DERMATOLOGY

## 2019-08-20 PROCEDURE — 17000 DESTRUCT PREMALG LESION: CPT | Mod: S$PBB,,, | Performed by: DERMATOLOGY

## 2019-08-20 PROCEDURE — 17000 DESTRUCT PREMALG LESION: CPT | Mod: PBBFAC,PO | Performed by: DERMATOLOGY

## 2019-08-20 RX ORDER — FLUOROURACIL 50 MG/G
CREAM TOPICAL
Qty: 40 G | Refills: 3 | Status: SHIPPED | OUTPATIENT
Start: 2019-08-20

## 2019-08-20 NOTE — PROGRESS NOTES
Subjective:       Patient ID:  Franklin Perez is a 89 y.o. male who presents for   Chief Complaint   Patient presents with    Skin Check     UBSE     This is a high risk patient here to check for the development of new lesions.  History of Present Illness: The patient presents with chief complaint of spots.  Location: scalp  Duration: montjhs  Signs/Symptoms: none    Prior treatments: none        Review of Systems   Constitutional: Negative for fever, chills, weight loss, weight gain, fatigue, night sweats and malaise.   Skin: Positive for wears hat. Negative for daily sunscreen use and activity-related sunscreen use.   Hematologic/Lymphatic: Bruises/bleeds easily.        Objective:    Physical Exam   Constitutional: He appears well-developed and well-nourished. No distress.   Neurological: He is alert and oriented to person, place, and time. He is not disoriented.   Psychiatric: He has a normal mood and affect.   Skin:   Areas Examined (abnormalities noted in diagram):   Scalp / Hair Palpated and Inspected  Head / Face Inspection Performed  Neck Inspection Performed  Chest / Axilla Inspection Performed  Abdomen Inspection Performed  Back Inspection Performed  RUE Inspected  LUE Inspection Performed  RLE Inspected  LLE Inspection Performed                   Diagram Legend     Erythematous scaling macule/papule c/w actinic keratosis       Vascular papule c/w angioma      Pigmented verrucoid papule/plaque c/w seborrheic keratosis      Yellow umbilicated papule c/w sebaceous hyperplasia      Irregularly shaped tan macule c/w lentigo     1-2 mm smooth white papules consistent with Milia      Movable subcutaneous cyst with punctum c/w epidermal inclusion cyst      Subcutaneous movable cyst c/w pilar cyst      Firm pink to brown papule c/w dermatofibroma      Pedunculated fleshy papule(s) c/w skin tag(s)      Evenly pigmented macule c/w junctional nevus     Mildly variegated pigmented, slightly irregular-bordered  macule c/w mildly atypical nevus      Flesh colored to evenly pigmented papule c/w intradermal nevus       Pink pearly papule/plaque c/w basal cell carcinoma      Erythematous hyperkeratotic cursted plaque c/w SCC      Surgical scar with no sign of skin cancer recurrence      Open and closed comedones      Inflammatory papules and pustules      Verrucoid papule consistent consistent with wart     Erythematous eczematous patches and plaques     Dystrophic onycholytic nail with subungual debris c/w onychomycosis     Umbilicated papule    Erythematous-base heme-crusted tan verrucoid plaque consistent with inflamed seborrheic keratosis     Erythematous Silvery Scaling Plaque c/w Psoriasis     See annotation      Assessment / Plan:        Multiple actinic keratoses   Cryosurgery Procedure Note    Verbal consent from the patient is obtained and the patient is aware of the precancerous quality and need for treatment of these lesions. Liquid nitrogen cryosurgery is applied to the 5 actinic keratoses, as detailed in the physical exam, to produce a freeze injury.  -     fluorouracil (EFUDEX) 5 % cream; Use hs for 2 weeks  Dispense: 40 g; Refill: 3    History of skin cancer  Comments:  [facility] [place] face    Seborrheic keratoses  reassurance               Follow up in about 3 months (around 11/20/2019).

## 2019-09-17 ENCOUNTER — TELEPHONE (OUTPATIENT)
Dept: DERMATOLOGY | Facility: CLINIC | Age: 84
End: 2019-09-17

## 2019-09-17 NOTE — TELEPHONE ENCOUNTER
----- Message from Laure Cleary MD sent at 9/16/2019  7:34 PM CDT -----  Contact: Kayla ( ) [phone]  He was using efudex, is he using it on the area?  Only needed to use if for 2 weeks.  He can use hydrocortisone on it and come in for follow up in a month.   ----- Message -----  From: Savannah Palomares MA  Sent: 9/16/2019   2:22 PM  To: Laure Cleary MD    Hi Dr. Cleary Mrs Garza stated that Mr. Perez is having inflamed around his face where you did the cryo he's asking to be seen I informed to her that you can call in some cream.  ----- Message -----  From: Jeannie Pagan  Sent: 9/16/2019  10:00 AM  To: Evin Gilletteasha called to speak to someone regarding an earlier appointment. The patient has inflammation in his face. The same area Dr. Cleary treated during his last visit. Please contact her to discuss further.

## 2019-09-24 ENCOUNTER — OFFICE VISIT (OUTPATIENT)
Dept: DERMATOLOGY | Facility: CLINIC | Age: 84
End: 2019-09-24
Payer: MEDICARE

## 2019-09-24 VITALS — BODY MASS INDEX: 31.09 KG/M2 | WEIGHT: 170 LBS

## 2019-09-24 DIAGNOSIS — L85.9 HYPERKERATOSIS: Primary | ICD-10-CM

## 2019-09-24 DIAGNOSIS — Z85.828 HISTORY OF SKIN CANCER: ICD-10-CM

## 2019-09-24 PROCEDURE — 99212 PR OFFICE/OUTPT VISIT, EST, LEVL II, 10-19 MIN: ICD-10-PCS | Mod: S$PBB,,, | Performed by: DERMATOLOGY

## 2019-09-24 PROCEDURE — 99212 OFFICE O/P EST SF 10 MIN: CPT | Mod: PBBFAC,PO | Performed by: DERMATOLOGY

## 2019-09-24 PROCEDURE — 99212 OFFICE O/P EST SF 10 MIN: CPT | Mod: S$PBB,,, | Performed by: DERMATOLOGY

## 2019-09-24 PROCEDURE — 99999 PR PBB SHADOW E&M-EST. PATIENT-LVL II: CPT | Mod: PBBFAC,,, | Performed by: DERMATOLOGY

## 2019-09-24 PROCEDURE — 99999 PR PBB SHADOW E&M-EST. PATIENT-LVL II: ICD-10-PCS | Mod: PBBFAC,,, | Performed by: DERMATOLOGY

## 2019-09-24 NOTE — PROGRESS NOTES
Subjective:       Patient ID:  Franklin Perez is a 89 y.o. male who presents for   Chief Complaint   Patient presents with    Follow-up     scalp, face     Used efudex on his scalp and it has peeled some, now with hyperkeratosis scalp no specific lesions.  This is a high risk patient here to check for the development of new lesions.      Follow-up  - Initial  Affected locations: scalp and face  Signs / symptoms: itching and irritated  Improvement on treatment: no relief        Review of Systems   Constitutional: Negative for fever, chills, weight loss, weight gain, fatigue, night sweats and malaise.   Skin: Positive for wears hat. Negative for daily sunscreen use and activity-related sunscreen use.   Hematologic/Lymphatic: Bruises/bleeds easily.        Objective:    Physical Exam   Constitutional: He appears well-developed and well-nourished. No distress.   Neurological: He is alert and oriented to person, place, and time. He is not disoriented.   Psychiatric: He has a normal mood and affect.   Skin:   Areas Examined (abnormalities noted in diagram):   Scalp / Hair Palpated and Inspected  Head / Face Inspection Performed  Neck Inspection Performed  RUE Inspected  LUE Inspection Performed                   Diagram Legend     Erythematous scaling macule/papule c/w actinic keratosis       Vascular papule c/w angioma      Pigmented verrucoid papule/plaque c/w seborrheic keratosis      Yellow umbilicated papule c/w sebaceous hyperplasia      Irregularly shaped tan macule c/w lentigo     1-2 mm smooth white papules consistent with Milia      Movable subcutaneous cyst with punctum c/w epidermal inclusion cyst      Subcutaneous movable cyst c/w pilar cyst      Firm pink to brown papule c/w dermatofibroma      Pedunculated fleshy papule(s) c/w skin tag(s)      Evenly pigmented macule c/w junctional nevus     Mildly variegated pigmented, slightly irregular-bordered macule c/w mildly atypical nevus      Flesh colored to  evenly pigmented papule c/w intradermal nevus       Pink pearly papule/plaque c/w basal cell carcinoma      Erythematous hyperkeratotic cursted plaque c/w SCC      Surgical scar with no sign of skin cancer recurrence      Open and closed comedones      Inflammatory papules and pustules      Verrucoid papule consistent consistent with wart     Erythematous eczematous patches and plaques     Dystrophic onycholytic nail with subungual debris c/w onychomycosis     Umbilicated papule    Erythematous-base heme-crusted tan verrucoid plaque consistent with inflamed seborrheic keratosis     Erythematous Silvery Scaling Plaque c/w Psoriasis     See annotation      Assessment / Plan:        Hyperkeratosis  Use ovace shampoo  Recheck in November    History of skin cancer  [place]  [facility] [place]             Follow up in about 3 months (around 12/24/2019).

## 2019-11-12 ENCOUNTER — OFFICE VISIT (OUTPATIENT)
Dept: DERMATOLOGY | Facility: CLINIC | Age: 84
End: 2019-11-12
Payer: MEDICARE

## 2019-11-12 VITALS — WEIGHT: 170 LBS | BODY MASS INDEX: 31.09 KG/M2

## 2019-11-12 DIAGNOSIS — Z85.828 HISTORY OF SKIN CANCER: ICD-10-CM

## 2019-11-12 DIAGNOSIS — L85.9 HYPERKERATOSIS: Primary | ICD-10-CM

## 2019-11-12 PROCEDURE — 99212 PR OFFICE/OUTPT VISIT, EST, LEVL II, 10-19 MIN: ICD-10-PCS | Mod: S$PBB,,, | Performed by: DERMATOLOGY

## 2019-11-12 PROCEDURE — 99999 PR PBB SHADOW E&M-EST. PATIENT-LVL II: ICD-10-PCS | Mod: PBBFAC,,, | Performed by: DERMATOLOGY

## 2019-11-12 PROCEDURE — 99999 PR PBB SHADOW E&M-EST. PATIENT-LVL II: CPT | Mod: PBBFAC,,, | Performed by: DERMATOLOGY

## 2019-11-12 PROCEDURE — 99212 OFFICE O/P EST SF 10 MIN: CPT | Mod: S$PBB,,, | Performed by: DERMATOLOGY

## 2019-11-12 PROCEDURE — 99212 OFFICE O/P EST SF 10 MIN: CPT | Mod: PBBFAC,PO | Performed by: DERMATOLOGY

## 2019-11-12 NOTE — PROGRESS NOTES
Subjective:       Patient ID:  Franklin Perez is a 89 y.o. male who presents for   Chief Complaint   Patient presents with    Follow-up     scalp. face     Scalp much improved from last visit, 3 areas of hyperkeratosis remain.   This is a high risk patient here to check for the development of new lesions.      Follow-up  - Follow-up  Symptom course: improving  Affected locations: scalp and face        Review of Systems   Constitutional: Negative for fever, chills, weight loss, weight gain, fatigue, night sweats and malaise.   Skin: Negative for daily sunscreen use and activity-related sunscreen use.   Hematologic/Lymphatic: Bruises/bleeds easily.        Objective:    Physical Exam   Constitutional: He appears well-developed and well-nourished. No distress.   Neurological: He is alert and oriented to person, place, and time. He is not disoriented.   Psychiatric: He has a normal mood and affect.   Skin:   Areas Examined (abnormalities noted in diagram):   Scalp / Hair Palpated and Inspected  Head / Face Inspection Performed  Neck Inspection Performed  RUE Inspected  LUE Inspection Performed                   Diagram Legend     Erythematous scaling macule/papule c/w actinic keratosis       Vascular papule c/w angioma      Pigmented verrucoid papule/plaque c/w seborrheic keratosis      Yellow umbilicated papule c/w sebaceous hyperplasia      Irregularly shaped tan macule c/w lentigo     1-2 mm smooth white papules consistent with Milia      Movable subcutaneous cyst with punctum c/w epidermal inclusion cyst      Subcutaneous movable cyst c/w pilar cyst      Firm pink to brown papule c/w dermatofibroma      Pedunculated fleshy papule(s) c/w skin tag(s)      Evenly pigmented macule c/w junctional nevus     Mildly variegated pigmented, slightly irregular-bordered macule c/w mildly atypical nevus      Flesh colored to evenly pigmented papule c/w intradermal nevus       Pink pearly papule/plaque c/w basal cell carcinoma       Erythematous hyperkeratotic cursted plaque c/w SCC      Surgical scar with no sign of skin cancer recurrence      Open and closed comedones      Inflammatory papules and pustules      Verrucoid papule consistent consistent with wart     Erythematous eczematous patches and plaques     Dystrophic onycholytic nail with subungual debris c/w onychomycosis     Umbilicated papule    Erythematous-base heme-crusted tan verrucoid plaque consistent with inflamed seborrheic keratosis     Erythematous Silvery Scaling Plaque c/w Psoriasis     See annotation      Assessment / Plan:        Hyperkeratosis much improved  Cont ovace shampoo   Recheck areas 2-3 months    History of skin cancers  mnmsx             Follow up in about 3 months (around 2/12/2020).

## 2021-05-11 ENCOUNTER — OFFICE VISIT (OUTPATIENT)
Dept: PODIATRY | Facility: CLINIC | Age: 86
End: 2021-05-11
Payer: MEDICARE

## 2021-05-11 VITALS
TEMPERATURE: 97 F | BODY MASS INDEX: 31.28 KG/M2 | WEIGHT: 170 LBS | HEART RATE: 92 BPM | DIASTOLIC BLOOD PRESSURE: 70 MMHG | SYSTOLIC BLOOD PRESSURE: 168 MMHG | HEIGHT: 62 IN

## 2021-05-11 DIAGNOSIS — G60.9 IDIOPATHIC PERIPHERAL NEUROPATHY: Primary | ICD-10-CM

## 2021-05-11 DIAGNOSIS — M24.573 EQUINUS CONTRACTURE OF ANKLE: ICD-10-CM

## 2021-05-11 DIAGNOSIS — M79.671 HEEL PAIN, BILATERAL: ICD-10-CM

## 2021-05-11 DIAGNOSIS — R60.0 EDEMA LEG: ICD-10-CM

## 2021-05-11 DIAGNOSIS — M79.672 HEEL PAIN, BILATERAL: ICD-10-CM

## 2021-05-11 PROCEDURE — 17999 PR NON-COVERED FOOT CARE: ICD-10-PCS | Mod: CSM,,, | Performed by: PODIATRIST

## 2021-05-11 PROCEDURE — 99999 PR PBB SHADOW E&M-EST. PATIENT-LVL V: CPT | Mod: PBBFAC,,, | Performed by: PODIATRIST

## 2021-05-11 PROCEDURE — 99215 OFFICE O/P EST HI 40 MIN: CPT | Mod: PBBFAC,PN | Performed by: PODIATRIST

## 2021-05-11 PROCEDURE — 17999 UNLISTD PX SKN MUC MEMB SUBQ: CPT | Mod: CSM,,, | Performed by: PODIATRIST

## 2021-05-11 PROCEDURE — 99204 OFFICE O/P NEW MOD 45 MIN: CPT | Mod: ,,, | Performed by: PODIATRIST

## 2021-05-11 PROCEDURE — 99204 PR OFFICE/OUTPT VISIT, NEW, LEVL IV, 45-59 MIN: ICD-10-PCS | Mod: ,,, | Performed by: PODIATRIST

## 2021-05-11 PROCEDURE — 99999 PR PBB SHADOW E&M-EST. PATIENT-LVL V: ICD-10-PCS | Mod: PBBFAC,,, | Performed by: PODIATRIST

## 2021-05-11 RX ORDER — CICLOPIROX OLAMINE 7.7 MG/G
CREAM TOPICAL 2 TIMES DAILY
Qty: 90 G | Refills: 2 | Status: SHIPPED | OUTPATIENT
Start: 2021-05-11

## 2021-05-11 RX ORDER — CIPROFLOXACIN 250 MG/1
TABLET, FILM COATED ORAL
COMMUNITY
Start: 2010-08-23 | End: 2021-07-29 | Stop reason: ALTCHOICE

## 2021-05-11 RX ORDER — LIDOCAINE HYDROCHLORIDE 20 MG/ML
JELLY TOPICAL
Qty: 30 ML | Refills: 2 | Status: SHIPPED | OUTPATIENT
Start: 2021-05-11

## 2021-05-11 RX ORDER — FUROSEMIDE 20 MG/1
20 TABLET ORAL
COMMUNITY
Start: 2021-04-26 | End: 2022-04-26

## 2021-05-21 ENCOUNTER — TELEPHONE (OUTPATIENT)
Dept: PODIATRY | Facility: CLINIC | Age: 86
End: 2021-05-21

## 2021-05-24 ENCOUNTER — TELEPHONE (OUTPATIENT)
Dept: PODIATRY | Facility: CLINIC | Age: 86
End: 2021-05-24

## 2021-06-02 ENCOUNTER — TELEPHONE (OUTPATIENT)
Dept: PODIATRY | Facility: CLINIC | Age: 86
End: 2021-06-02

## 2021-06-17 ENCOUNTER — OFFICE VISIT (OUTPATIENT)
Dept: PODIATRY | Facility: CLINIC | Age: 86
End: 2021-06-17
Payer: MEDICARE

## 2021-06-17 VITALS
SYSTOLIC BLOOD PRESSURE: 154 MMHG | BODY MASS INDEX: 31.28 KG/M2 | WEIGHT: 170 LBS | DIASTOLIC BLOOD PRESSURE: 67 MMHG | HEART RATE: 85 BPM | HEIGHT: 62 IN

## 2021-06-17 DIAGNOSIS — B35.1 ONYCHOMYCOSIS DUE TO DERMATOPHYTE: Primary | ICD-10-CM

## 2021-06-17 PROCEDURE — 99499 UNLISTED E&M SERVICE: CPT | Mod: ,,, | Performed by: PODIATRIST

## 2021-06-17 PROCEDURE — 17999 PR NON-COVERED FOOT CARE: ICD-10-PCS | Mod: CSM,,, | Performed by: PODIATRIST

## 2021-06-17 PROCEDURE — 99213 OFFICE O/P EST LOW 20 MIN: CPT | Mod: PBBFAC,PN | Performed by: PODIATRIST

## 2021-06-17 PROCEDURE — 99999 PR PBB SHADOW E&M-EST. PATIENT-LVL III: ICD-10-PCS | Mod: PBBFAC,,, | Performed by: PODIATRIST

## 2021-06-17 PROCEDURE — 99499 NO LOS: ICD-10-PCS | Mod: ,,, | Performed by: PODIATRIST

## 2021-06-17 PROCEDURE — 17999 UNLISTD PX SKN MUC MEMB SUBQ: CPT | Mod: CSM,,, | Performed by: PODIATRIST

## 2021-06-17 PROCEDURE — 99999 PR PBB SHADOW E&M-EST. PATIENT-LVL III: CPT | Mod: PBBFAC,,, | Performed by: PODIATRIST

## 2021-06-30 ENCOUNTER — OFFICE VISIT (OUTPATIENT)
Dept: OPHTHALMOLOGY | Facility: CLINIC | Age: 86
End: 2021-06-30
Payer: MEDICARE

## 2021-06-30 DIAGNOSIS — H27.139 POSTERIOR DISLOCATION OF LENS, UNSPECIFIED LATERALITY: ICD-10-CM

## 2021-06-30 DIAGNOSIS — H26.8 PSEUDOEXFOLIATION SYNDROME: Primary | ICD-10-CM

## 2021-06-30 DIAGNOSIS — H25.11 NUCLEAR SCLEROSIS OF RIGHT EYE: ICD-10-CM

## 2021-06-30 PROCEDURE — 92136 OPHTHALMIC BIOMETRY: CPT | Mod: PBBFAC | Performed by: OPHTHALMOLOGY

## 2021-06-30 PROCEDURE — 99213 OFFICE O/P EST LOW 20 MIN: CPT | Mod: PBBFAC,25 | Performed by: OPHTHALMOLOGY

## 2021-06-30 PROCEDURE — 92136 IOL MASTER - OU - BOTH EYES: ICD-10-PCS | Mod: 26,S$PBB,RT, | Performed by: OPHTHALMOLOGY

## 2021-06-30 PROCEDURE — 99204 PR OFFICE/OUTPT VISIT, NEW, LEVL IV, 45-59 MIN: ICD-10-PCS | Mod: S$PBB,,, | Performed by: OPHTHALMOLOGY

## 2021-06-30 PROCEDURE — 99204 OFFICE O/P NEW MOD 45 MIN: CPT | Mod: S$PBB,,, | Performed by: OPHTHALMOLOGY

## 2021-06-30 PROCEDURE — 99999 PR PBB SHADOW E&M-EST. PATIENT-LVL III: ICD-10-PCS | Mod: PBBFAC,,, | Performed by: OPHTHALMOLOGY

## 2021-06-30 PROCEDURE — 99999 PR PBB SHADOW E&M-EST. PATIENT-LVL III: CPT | Mod: PBBFAC,,, | Performed by: OPHTHALMOLOGY

## 2021-06-30 RX ORDER — TROPICAMIDE 10 MG/ML
1 SOLUTION/ DROPS OPHTHALMIC
Status: CANCELLED | OUTPATIENT
Start: 2021-06-30

## 2021-06-30 RX ORDER — TETRACAINE HYDROCHLORIDE 5 MG/ML
1 SOLUTION OPHTHALMIC
Status: CANCELLED | OUTPATIENT
Start: 2021-06-30

## 2021-06-30 RX ORDER — MOXIFLOXACIN 5 MG/ML
1 SOLUTION/ DROPS OPHTHALMIC
Status: CANCELLED | OUTPATIENT
Start: 2021-06-30

## 2021-06-30 RX ORDER — SODIUM CHLORIDE 0.9 % (FLUSH) 0.9 %
10 SYRINGE (ML) INJECTION
Status: SHIPPED | OUTPATIENT
Start: 2021-06-30

## 2021-06-30 RX ORDER — PHENYLEPHRINE HYDROCHLORIDE 25 MG/ML
1 SOLUTION/ DROPS OPHTHALMIC
Status: CANCELLED | OUTPATIENT
Start: 2021-06-30

## 2021-06-30 RX ORDER — PREDNISOLONE ACETATE-GATIFLOXACIN-BROMFENAC .75; 5; 1 MG/ML; MG/ML; MG/ML
1 SUSPENSION/ DROPS OPHTHALMIC 3 TIMES DAILY
Qty: 5 ML | Refills: 3 | Status: SHIPPED | OUTPATIENT
Start: 2021-06-30 | End: 2022-05-20

## 2021-07-13 ENCOUNTER — TELEPHONE (OUTPATIENT)
Dept: OPHTHALMOLOGY | Facility: CLINIC | Age: 86
End: 2021-07-13

## 2021-07-13 DIAGNOSIS — H25.11 NUCLEAR SCLEROTIC CATARACT OF RIGHT EYE: Primary | ICD-10-CM

## 2021-07-27 ENCOUNTER — TELEPHONE (OUTPATIENT)
Dept: OPHTHALMOLOGY | Facility: CLINIC | Age: 86
End: 2021-07-27

## 2021-07-28 ENCOUNTER — TELEPHONE (OUTPATIENT)
Dept: OPHTHALMOLOGY | Facility: CLINIC | Age: 86
End: 2021-07-28

## 2021-07-28 DIAGNOSIS — H25.12 NUCLEAR SCLEROTIC CATARACT OF LEFT EYE: Primary | ICD-10-CM

## 2021-07-29 ENCOUNTER — TELEPHONE (OUTPATIENT)
Dept: OPHTHALMOLOGY | Facility: CLINIC | Age: 86
End: 2021-07-29

## 2021-07-30 ENCOUNTER — TELEPHONE (OUTPATIENT)
Dept: OPHTHALMOLOGY | Facility: CLINIC | Age: 86
End: 2021-07-30

## 2021-08-02 ENCOUNTER — ANESTHESIA (OUTPATIENT)
Dept: SURGERY | Facility: OTHER | Age: 86
End: 2021-08-02
Payer: MEDICARE

## 2021-08-02 ENCOUNTER — ANESTHESIA EVENT (OUTPATIENT)
Dept: SURGERY | Facility: OTHER | Age: 86
End: 2021-08-02
Payer: MEDICARE

## 2021-08-02 ENCOUNTER — HOSPITAL ENCOUNTER (OUTPATIENT)
Facility: OTHER | Age: 86
Discharge: HOME | End: 2021-08-02
Attending: OPHTHALMOLOGY | Admitting: OPHTHALMOLOGY
Payer: MEDICARE

## 2021-08-02 VITALS
OXYGEN SATURATION: 93 % | TEMPERATURE: 98 F | SYSTOLIC BLOOD PRESSURE: 125 MMHG | RESPIRATION RATE: 18 BRPM | BODY MASS INDEX: 33.13 KG/M2 | DIASTOLIC BLOOD PRESSURE: 60 MMHG | HEART RATE: 66 BPM | WEIGHT: 180 LBS | HEIGHT: 62 IN

## 2021-08-02 DIAGNOSIS — H26.8 PSEUDOEXFOLIATION SYNDROME: ICD-10-CM

## 2021-08-02 DIAGNOSIS — H25.11 NUCLEAR SCLEROSIS OF RIGHT EYE: ICD-10-CM

## 2021-08-02 LAB — SARS-COV-2 RDRP RESP QL NAA+PROBE: NEGATIVE

## 2021-08-02 PROCEDURE — 36000707: Performed by: OPHTHALMOLOGY

## 2021-08-02 PROCEDURE — V2632 POST CHMBR INTRAOCULAR LENS: HCPCS | Performed by: OPHTHALMOLOGY

## 2021-08-02 PROCEDURE — 37000009 HC ANESTHESIA EA ADD 15 MINS: Performed by: OPHTHALMOLOGY

## 2021-08-02 PROCEDURE — U0002 COVID-19 LAB TEST NON-CDC: HCPCS | Performed by: OPHTHALMOLOGY

## 2021-08-02 PROCEDURE — 37000008 HC ANESTHESIA 1ST 15 MINUTES: Performed by: OPHTHALMOLOGY

## 2021-08-02 PROCEDURE — 63600175 PHARM REV CODE 636 W HCPCS: Performed by: NURSE ANESTHETIST, CERTIFIED REGISTERED

## 2021-08-02 PROCEDURE — 71000015 HC POSTOP RECOV 1ST HR: Performed by: OPHTHALMOLOGY

## 2021-08-02 PROCEDURE — 66982 PR REMOVAL, CATARACT, W/INSRT INTRAOC LENS, W/O ENDO CYCLO, CMPLX: ICD-10-PCS | Mod: RT,,, | Performed by: OPHTHALMOLOGY

## 2021-08-02 PROCEDURE — 66982 XCAPSL CTRC RMVL CPLX WO ECP: CPT | Mod: RT,,, | Performed by: OPHTHALMOLOGY

## 2021-08-02 PROCEDURE — 25000003 PHARM REV CODE 250: Performed by: OPHTHALMOLOGY

## 2021-08-02 PROCEDURE — 27201423 OPTIME MED/SURG SUP & DEVICES STERILE SUPPLY: Performed by: OPHTHALMOLOGY

## 2021-08-02 PROCEDURE — 36000706: Performed by: OPHTHALMOLOGY

## 2021-08-02 DEVICE — LENS EYHANCE +23.0D: Type: IMPLANTABLE DEVICE | Site: EYE | Status: FUNCTIONAL

## 2021-08-02 RX ORDER — PHENYLEPHRINE HYDROCHLORIDE 25 MG/ML
1 SOLUTION/ DROPS OPHTHALMIC
Status: DISCONTINUED | OUTPATIENT
Start: 2021-08-02 | End: 2021-08-02 | Stop reason: HOSPADM

## 2021-08-02 RX ORDER — PROPARACAINE HYDROCHLORIDE 5 MG/ML
1 SOLUTION/ DROPS OPHTHALMIC
Status: DISCONTINUED | OUTPATIENT
Start: 2021-08-02 | End: 2021-08-02 | Stop reason: HOSPADM

## 2021-08-02 RX ORDER — MOXIFLOXACIN 5 MG/ML
SOLUTION/ DROPS OPHTHALMIC
Status: DISCONTINUED | OUTPATIENT
Start: 2021-08-02 | End: 2021-08-02 | Stop reason: HOSPADM

## 2021-08-02 RX ORDER — ACETAMINOPHEN 325 MG/1
650 TABLET ORAL EVERY 4 HOURS PRN
Status: DISCONTINUED | OUTPATIENT
Start: 2021-08-02 | End: 2021-08-02 | Stop reason: HOSPADM

## 2021-08-02 RX ORDER — TROPICAMIDE 10 MG/ML
1 SOLUTION/ DROPS OPHTHALMIC
Status: DISCONTINUED | OUTPATIENT
Start: 2021-08-02 | End: 2021-08-02 | Stop reason: HOSPADM

## 2021-08-02 RX ORDER — MOXIFLOXACIN 5 MG/ML
1 SOLUTION/ DROPS OPHTHALMIC
Status: COMPLETED | OUTPATIENT
Start: 2021-08-02 | End: 2021-08-02

## 2021-08-02 RX ORDER — TETRACAINE HYDROCHLORIDE 5 MG/ML
1 SOLUTION OPHTHALMIC
Status: DISCONTINUED | OUTPATIENT
Start: 2021-08-02 | End: 2021-08-02 | Stop reason: HOSPADM

## 2021-08-02 RX ORDER — LIDOCAINE HYDROCHLORIDE 10 MG/ML
INJECTION, SOLUTION EPIDURAL; INFILTRATION; INTRACAUDAL; PERINEURAL
Status: DISCONTINUED | OUTPATIENT
Start: 2021-08-02 | End: 2021-08-02 | Stop reason: HOSPADM

## 2021-08-02 RX ORDER — MIDAZOLAM HYDROCHLORIDE 1 MG/ML
INJECTION INTRAMUSCULAR; INTRAVENOUS
Status: DISCONTINUED | OUTPATIENT
Start: 2021-08-02 | End: 2021-08-02

## 2021-08-02 RX ORDER — TETRACAINE HYDROCHLORIDE 5 MG/ML
SOLUTION OPHTHALMIC
Status: DISCONTINUED | OUTPATIENT
Start: 2021-08-02 | End: 2021-08-02 | Stop reason: HOSPADM

## 2021-08-02 RX ORDER — LIDOCAINE HYDROCHLORIDE 40 MG/ML
INJECTION, SOLUTION RETROBULBAR
Status: DISCONTINUED | OUTPATIENT
Start: 2021-08-02 | End: 2021-08-02 | Stop reason: HOSPADM

## 2021-08-02 RX ORDER — MOXIFLOXACIN 5 MG/ML
1 SOLUTION/ DROPS OPHTHALMIC
Status: DISCONTINUED | OUTPATIENT
Start: 2021-08-02 | End: 2021-08-02 | Stop reason: HOSPADM

## 2021-08-02 RX ADMIN — MIDAZOLAM HYDROCHLORIDE 0.5 MG: 1 INJECTION, SOLUTION INTRAMUSCULAR; INTRAVENOUS at 11:08

## 2021-08-02 RX ADMIN — MOXIFLOXACIN 1 DROP: 5 SOLUTION/ DROPS OPHTHALMIC at 12:08

## 2021-08-03 ENCOUNTER — OFFICE VISIT (OUTPATIENT)
Dept: OPHTHALMOLOGY | Facility: CLINIC | Age: 86
End: 2021-08-03
Attending: OPHTHALMOLOGY
Payer: MEDICARE

## 2021-08-03 DIAGNOSIS — Z98.890 POST-OPERATIVE STATE: Primary | ICD-10-CM

## 2021-08-03 DIAGNOSIS — H25.11 NUCLEAR SCLEROTIC CATARACT OF RIGHT EYE: ICD-10-CM

## 2021-08-03 PROCEDURE — 99999 PR PBB SHADOW E&M-EST. PATIENT-LVL III: CPT | Mod: PBBFAC,,, | Performed by: OPHTHALMOLOGY

## 2021-08-03 PROCEDURE — 99024 POSTOP FOLLOW-UP VISIT: CPT | Mod: POP,,, | Performed by: OPHTHALMOLOGY

## 2021-08-03 PROCEDURE — 99213 OFFICE O/P EST LOW 20 MIN: CPT | Mod: PBBFAC | Performed by: OPHTHALMOLOGY

## 2021-08-03 PROCEDURE — 99999 PR PBB SHADOW E&M-EST. PATIENT-LVL III: ICD-10-PCS | Mod: PBBFAC,,, | Performed by: OPHTHALMOLOGY

## 2021-08-03 PROCEDURE — 99024 PR POST-OP FOLLOW-UP VISIT: ICD-10-PCS | Mod: POP,,, | Performed by: OPHTHALMOLOGY

## 2021-08-11 ENCOUNTER — OFFICE VISIT (OUTPATIENT)
Dept: OPHTHALMOLOGY | Facility: CLINIC | Age: 86
End: 2021-08-11
Payer: MEDICARE

## 2021-08-11 DIAGNOSIS — H27.132 POSTERIOR DISLOCATION OF LEFT LENS: ICD-10-CM

## 2021-08-11 DIAGNOSIS — H25.11 NUCLEAR SCLEROTIC CATARACT OF RIGHT EYE: ICD-10-CM

## 2021-08-11 DIAGNOSIS — Z98.890 POST-OPERATIVE STATE: Primary | ICD-10-CM

## 2021-08-11 PROCEDURE — 99999 PR PBB SHADOW E&M-EST. PATIENT-LVL III: ICD-10-PCS | Mod: PBBFAC,,, | Performed by: OPHTHALMOLOGY

## 2021-08-11 PROCEDURE — 92136 IOL MASTER - OU - BOTH EYES: ICD-10-PCS | Mod: 26,S$PBB,LT, | Performed by: OPHTHALMOLOGY

## 2021-08-11 PROCEDURE — 99213 OFFICE O/P EST LOW 20 MIN: CPT | Mod: PBBFAC | Performed by: OPHTHALMOLOGY

## 2021-08-11 PROCEDURE — 92136 OPHTHALMIC BIOMETRY: CPT | Mod: PBBFAC | Performed by: OPHTHALMOLOGY

## 2021-08-11 PROCEDURE — 99024 POSTOP FOLLOW-UP VISIT: CPT | Mod: POP,,, | Performed by: OPHTHALMOLOGY

## 2021-08-11 PROCEDURE — 99999 PR PBB SHADOW E&M-EST. PATIENT-LVL III: CPT | Mod: PBBFAC,,, | Performed by: OPHTHALMOLOGY

## 2021-08-11 PROCEDURE — 99024 PR POST-OP FOLLOW-UP VISIT: ICD-10-PCS | Mod: POP,,, | Performed by: OPHTHALMOLOGY

## 2021-08-11 RX ORDER — SODIUM CHLORIDE 0.9 % (FLUSH) 0.9 %
10 SYRINGE (ML) INJECTION
Status: SHIPPED | OUTPATIENT
Start: 2021-08-11

## 2021-08-11 RX ORDER — PHENYLEPHRINE HYDROCHLORIDE 25 MG/ML
1 SOLUTION/ DROPS OPHTHALMIC
Status: CANCELLED | OUTPATIENT
Start: 2021-08-11

## 2021-08-11 RX ORDER — MOXIFLOXACIN 5 MG/ML
1 SOLUTION/ DROPS OPHTHALMIC
Status: CANCELLED | OUTPATIENT
Start: 2021-08-11

## 2021-08-11 RX ORDER — TROPICAMIDE 10 MG/ML
1 SOLUTION/ DROPS OPHTHALMIC
Status: CANCELLED | OUTPATIENT
Start: 2021-08-11

## 2021-08-11 RX ORDER — TETRACAINE HYDROCHLORIDE 5 MG/ML
1 SOLUTION OPHTHALMIC
Status: CANCELLED | OUTPATIENT
Start: 2021-08-11

## 2021-08-12 ENCOUNTER — TELEPHONE (OUTPATIENT)
Dept: OPHTHALMOLOGY | Facility: CLINIC | Age: 86
End: 2021-08-12

## 2021-09-16 ENCOUNTER — TELEPHONE (OUTPATIENT)
Dept: OPHTHALMOLOGY | Facility: CLINIC | Age: 86
End: 2021-09-16

## 2021-09-17 ENCOUNTER — TELEPHONE (OUTPATIENT)
Dept: OPHTHALMOLOGY | Facility: CLINIC | Age: 86
End: 2021-09-17

## 2021-09-21 ENCOUNTER — TELEPHONE (OUTPATIENT)
Dept: OPHTHALMOLOGY | Facility: CLINIC | Age: 86
End: 2021-09-21

## 2021-10-11 DIAGNOSIS — H27.139 POSTERIOR DISLOCATION OF LENS, UNSPECIFIED LATERALITY: Primary | ICD-10-CM

## 2021-11-02 ENCOUNTER — TELEPHONE (OUTPATIENT)
Dept: OPHTHALMOLOGY | Facility: CLINIC | Age: 86
End: 2021-11-02
Payer: MEDICARE

## 2021-11-17 ENCOUNTER — TELEPHONE (OUTPATIENT)
Dept: OPHTHALMOLOGY | Facility: CLINIC | Age: 86
End: 2021-11-17
Payer: MEDICARE

## 2021-11-18 ENCOUNTER — TELEPHONE (OUTPATIENT)
Dept: OPHTHALMOLOGY | Facility: CLINIC | Age: 86
End: 2021-11-18
Payer: MEDICARE

## 2021-11-19 ENCOUNTER — TELEPHONE (OUTPATIENT)
Dept: OPHTHALMOLOGY | Facility: CLINIC | Age: 86
End: 2021-11-19
Payer: MEDICARE

## 2021-11-22 ENCOUNTER — TELEPHONE (OUTPATIENT)
Dept: OPHTHALMOLOGY | Facility: CLINIC | Age: 86
End: 2021-11-22
Payer: MEDICARE

## 2022-02-08 ENCOUNTER — OFFICE VISIT (OUTPATIENT)
Dept: OPHTHALMOLOGY | Facility: CLINIC | Age: 87
End: 2022-02-08
Attending: OPHTHALMOLOGY
Payer: MEDICARE

## 2022-02-08 DIAGNOSIS — H27.132 POSTERIOR DISLOCATION OF LEFT LENS: Primary | ICD-10-CM

## 2022-02-08 PROCEDURE — 99999 PR PBB SHADOW E&M-EST. PATIENT-LVL III: ICD-10-PCS | Mod: PBBFAC,,, | Performed by: OPHTHALMOLOGY

## 2022-02-08 PROCEDURE — 92014 PR EYE EXAM, EST PATIENT,COMPREHESV: ICD-10-PCS | Mod: S$PBB,,, | Performed by: OPHTHALMOLOGY

## 2022-02-08 PROCEDURE — 99999 PR PBB SHADOW E&M-EST. PATIENT-LVL III: CPT | Mod: PBBFAC,,, | Performed by: OPHTHALMOLOGY

## 2022-02-08 PROCEDURE — 99213 OFFICE O/P EST LOW 20 MIN: CPT | Mod: PBBFAC | Performed by: OPHTHALMOLOGY

## 2022-02-08 PROCEDURE — 92014 COMPRE OPH EXAM EST PT 1/>: CPT | Mod: S$PBB,,, | Performed by: OPHTHALMOLOGY

## 2022-02-08 RX ORDER — TROPICAMIDE 10 MG/ML
1 SOLUTION/ DROPS OPHTHALMIC
Status: CANCELLED | OUTPATIENT
Start: 2022-02-08

## 2022-02-08 RX ORDER — PREDNISOLONE ACETATE-GATIFLOXACIN-BROMFENAC .75; 5; 1 MG/ML; MG/ML; MG/ML
1 SUSPENSION/ DROPS OPHTHALMIC 3 TIMES DAILY
Qty: 5 ML | Refills: 3 | Status: SHIPPED | OUTPATIENT
Start: 2022-02-08

## 2022-02-08 RX ORDER — MOXIFLOXACIN 5 MG/ML
1 SOLUTION/ DROPS OPHTHALMIC
Status: CANCELLED | OUTPATIENT
Start: 2022-02-08

## 2022-02-08 RX ORDER — PHENYLEPHRINE HYDROCHLORIDE 25 MG/ML
1 SOLUTION/ DROPS OPHTHALMIC
Status: CANCELLED | OUTPATIENT
Start: 2022-02-08

## 2022-02-08 RX ORDER — TETRACAINE HYDROCHLORIDE 5 MG/ML
1 SOLUTION OPHTHALMIC
Status: CANCELLED | OUTPATIENT
Start: 2022-02-08

## 2022-02-08 RX ORDER — SODIUM CHLORIDE 0.9 % (FLUSH) 0.9 %
10 SYRINGE (ML) INJECTION
Status: SHIPPED | OUTPATIENT
Start: 2022-02-08

## 2022-02-08 NOTE — PROGRESS NOTES
HPI     Cataract Eval OS    Patient is here with his friend Kayla, who is translating in Icelandic.   Patient states he is ready for his cataract sx in the left eye. Patient   states his vision has been getting more blurry and is getting   progressively worse. Patient states it looks like he is looking through a   fog. Patient denies sensitivity to light.     No gtts.     Last edited by Leesa Mora on 2/8/2022  3:10 PM. (History)            Assessment /Plan     For exam results, see Encounter Report.    Posterior dislocation of left lens      SUbluxed IOL in the bag from PXF with small pupil...  Plan:  IOL exchange with Yamane scleral fixation OS  Superior incision, temporal sclerotomy, PPV etc...     Left eye  IOL: [employer] CT MARK 25.0 for Yamane     Small pupil, PXF  Long case so schedule as the only non phaco case of the day.

## 2022-03-15 ENCOUNTER — TELEPHONE (OUTPATIENT)
Dept: OPHTHALMOLOGY | Facility: CLINIC | Age: 87
End: 2022-03-15
Payer: MEDICARE

## 2022-03-15 DIAGNOSIS — H27.132 POSTERIOR DISLOCATION OF LEFT LENS: Primary | ICD-10-CM

## 2022-03-15 DIAGNOSIS — H25.12 NUCLEAR SCLEROTIC CATARACT OF LEFT EYE: ICD-10-CM

## 2022-03-16 ENCOUNTER — TELEPHONE (OUTPATIENT)
Dept: OPHTHALMOLOGY | Facility: CLINIC | Age: 87
End: 2022-03-16
Payer: MEDICARE

## 2022-03-16 DIAGNOSIS — H25.12 NUCLEAR SCLEROTIC CATARACT OF LEFT EYE: ICD-10-CM

## 2022-03-16 DIAGNOSIS — H27.132 POSTERIOR DISLOCATION OF LEFT LENS: Primary | ICD-10-CM

## 2022-03-17 ENCOUNTER — TELEPHONE (OUTPATIENT)
Dept: OPHTHALMOLOGY | Facility: CLINIC | Age: 87
End: 2022-03-17
Payer: MEDICARE

## 2022-03-17 NOTE — TELEPHONE ENCOUNTER
----- Message from Dorene Bishop sent at 3/17/2022 11:25 AM CDT -----  Returning call to Debbi Garza (caregiver)  @  [phone]

## 2022-03-22 ENCOUNTER — OFFICE VISIT (OUTPATIENT)
Dept: PODIATRY | Facility: CLINIC | Age: 87
End: 2022-03-22
Payer: MEDICARE

## 2022-03-22 VITALS — WEIGHT: 180 LBS | BODY MASS INDEX: 33.13 KG/M2 | HEIGHT: 62 IN

## 2022-03-22 DIAGNOSIS — B35.1 ONYCHOMYCOSIS DUE TO DERMATOPHYTE: Primary | ICD-10-CM

## 2022-03-22 PROCEDURE — 17999 UNLISTD PX SKN MUC MEMB SUBQ: CPT | Mod: CSM,S$GLB,, | Performed by: PODIATRIST

## 2022-03-22 PROCEDURE — 99499 UNLISTED E&M SERVICE: CPT | Mod: ,,, | Performed by: PODIATRIST

## 2022-03-22 PROCEDURE — 99214 OFFICE O/P EST MOD 30 MIN: CPT | Mod: PBBFAC,PN | Performed by: PODIATRIST

## 2022-03-22 PROCEDURE — 17999 PR NON-COVERED FOOT CARE: ICD-10-PCS | Mod: CSM,S$GLB,, | Performed by: PODIATRIST

## 2022-03-22 PROCEDURE — 99499 NO LOS: ICD-10-PCS | Mod: ,,, | Performed by: PODIATRIST

## 2022-03-22 PROCEDURE — 99999 PR PBB SHADOW E&M-EST. PATIENT-LVL IV: ICD-10-PCS | Mod: PBBFAC,,, | Performed by: PODIATRIST

## 2022-03-22 PROCEDURE — 99999 PR PBB SHADOW E&M-EST. PATIENT-LVL IV: CPT | Mod: PBBFAC,,, | Performed by: PODIATRIST

## 2022-03-22 NOTE — PATIENT INSTRUCTIONS
General nail care measures for abnormal nails include:  Keeping nails trimmed short, but avoid overzealous trimming  Avoiding trauma   Avoiding contact irritants   Keeping nails dry (avoiding wet work)  Avoiding all nail cosmetics  Wearing shoes that fit well at the toe box.  Avoid tight shoes.     132

## 2022-03-22 NOTE — PROGRESS NOTES
The patient and his wife understand and verbally acknowledge that with his current medical conditions the trimming of nails is not a covered service by the insurance.  They are happy to pay the 42 dollar fee for non-covered foot care.    To visual inspection the skin has pink color capillary refill less than 3 sec to the toe tips and is without open skin or signs of infection.  There is no signs of ischemia either foot.  The nails are elongated and thickened and dystrophic.     present.     Non-covered foot care:    With the patient's permission, I debrided all ten toenails with a sterile nipper, removing all offending nail and debris.  Patient tolerated the procedure well and related significant relief.

## 2022-03-23 ENCOUNTER — PATIENT MESSAGE (OUTPATIENT)
Dept: OPHTHALMOLOGY | Facility: CLINIC | Age: 87
End: 2022-03-23
Payer: MEDICARE

## 2022-04-18 ENCOUNTER — TELEPHONE (OUTPATIENT)
Dept: OPHTHALMOLOGY | Facility: CLINIC | Age: 87
End: 2022-04-18
Payer: MEDICARE

## 2022-04-26 ENCOUNTER — OFFICE VISIT (OUTPATIENT)
Dept: PODIATRY | Facility: CLINIC | Age: 87
End: 2022-04-26
Payer: MEDICARE

## 2022-04-26 VITALS
BODY MASS INDEX: 33.1 KG/M2 | DIASTOLIC BLOOD PRESSURE: 70 MMHG | HEIGHT: 62 IN | SYSTOLIC BLOOD PRESSURE: 137 MMHG | HEART RATE: 85 BPM | WEIGHT: 179.88 LBS

## 2022-04-26 DIAGNOSIS — G60.9 IDIOPATHIC PERIPHERAL NEUROPATHY: Primary | ICD-10-CM

## 2022-04-26 DIAGNOSIS — R60.0 EDEMA LEG: ICD-10-CM

## 2022-04-26 DIAGNOSIS — B35.1 ONYCHOMYCOSIS DUE TO DERMATOPHYTE: ICD-10-CM

## 2022-04-26 PROCEDURE — 99999 PR PBB SHADOW E&M-EST. PATIENT-LVL III: CPT | Mod: PBBFAC,,, | Performed by: PODIATRIST

## 2022-04-26 PROCEDURE — 11721 DEBRIDE NAIL 6 OR MORE: CPT | Mod: Q9,PBBFAC,PN | Performed by: PODIATRIST

## 2022-04-26 PROCEDURE — 11721 ROUTINE FOOT CARE: ICD-10-PCS | Mod: Q9,S$PBB,, | Performed by: PODIATRIST

## 2022-04-26 PROCEDURE — 99999 PR PBB SHADOW E&M-EST. PATIENT-LVL III: ICD-10-PCS | Mod: PBBFAC,,, | Performed by: PODIATRIST

## 2022-04-26 PROCEDURE — 99499 UNLISTED E&M SERVICE: CPT | Mod: S$PBB,,, | Performed by: PODIATRIST

## 2022-04-26 PROCEDURE — 99499 NO LOS: ICD-10-PCS | Mod: S$PBB,,, | Performed by: PODIATRIST

## 2022-04-26 PROCEDURE — 99213 OFFICE O/P EST LOW 20 MIN: CPT | Mod: PBBFAC,PN | Performed by: PODIATRIST

## 2022-04-26 RX ORDER — GLUCOSAMINE/CHONDR SU A SOD 750-600 MG
TABLET ORAL
COMMUNITY

## 2022-04-26 NOTE — PROGRESS NOTES
Chief Complaint   Patient presents with    Nail Care    Diabetic Foot Exam     Foot Exam/PCP Yahir Russo Jr., MD          MEDICAL DECISION MAKING:       Problem List Items Addressed This Visit    None     Visit Diagnoses     Idiopathic peripheral neuropathy    -  Primary    Edema leg        Onychomycosis due to dermatophyte                · I counseled the patient on the patient's conditions, their implications and medical management.   Shoe inspection.     Maintain proper foot hygiene.   Continue wearing proper shoe gear, daily foot inspections, never walking without protective shoe gear, never putting sharp instruments to feet.          Routine Foot Care    Date/Time: 4/26/2022 11:00 AM  Performed by: Astrid Rodriguez DPM  Authorized by: Astrid Rodriguez DPM     Consent Done?:  Yes (Verbal)    Nail Care Type:  Debride  Location(s): All  (Left 1st Toe, Left 3rd Toe, Left 2nd Toe, Left 4th Toe, Left 5th Toe, Right 1st Toe, Right 2nd Toe, Right 3rd Toe, Right 4th Toe and Right 5th Toe)  Patient tolerance:  Patient tolerated the procedure well with no immediate complications     With patient's permission, the toenails mentioned above were reduced and debrided using a nail nipper, removing offending nail and debris. The patient will continue to monitor the areas daily, inspect the feet, wear protective shoe gear when ambulatory, and moisturizer to maintain skin integrity.                 HPI:   Franklin Perez is a 89 y.o. male who presents to clinic with concerns of fungal toenails.          Patient Active Problem List   Diagnosis    AK (actinic keratosis)    Pseudoexfoliation syndrome    Nuclear sclerotic cataract of right eye           Current Outpatient Medications on File Prior to Visit   Medication Sig Dispense Refill    aspirin (ECOTRIN) 81 MG EC tablet Take 81 mg by mouth once daily.      BYSTOLIC 10 mg Tab Take 10 mg by mouth 2 (two) times a day.   0    ciclopirox (LOPROX) 0.77 % Crea Apply topically 2  (two) times daily. 90 g 2    dicyclomine (BENTYL) 20 mg tablet Take 20 mg by mouth 2 (two) times daily.   0    fish oil-omega-3 fatty acids 300-1,000 mg capsule Take 2 g by mouth once daily.      fluorouracil (EFUDEX) 5 % cream Use hs for 2 weeks 40 g 3    furosemide (LASIX) 20 MG tablet Take 20 mg by mouth.      ginkgo biloba 60 mg Tab Take by mouth.      ginkgo biloba leaf extract 60 mg Tab Take by mouth.      hydrALAZINE (APRESOLINE) 50 MG tablet Take 50 mg by mouth.       LIDOcaine HCL 2% (XYLOCAINE) 2 % jelly Apply topically as needed. Apply topically once nightly to affected part of foot/feet. 30 mL 2    lorazepam (ATIVAN) 0.5 MG tablet       meclizine (ANTIVERT) 25 mg tablet       multivitamin (THERAGRAN) tablet Take 1 tablet by mouth once daily.      multivitamin-iron-folic acid Tab 1 capsule.      polyethylene glycol 3350 (MIRALAX ORAL) Take by mouth.      prednisolon/gatiflox/bromfenac (PREDNISOL ACE-GATIFLOX-BROMFEN) 1-0.5-0.075 % DrpS Apply 1 drop to eye 3 (three) times daily. in operative eye for 1 month after surgery 5 mL 3    prednisolon/gatiflox/bromfenac (PREDNISOL ACE-GATIFLOX-BROMFEN) 1-0.5-0.075 % DrpS Apply 1 drop to eye 3 (three) times daily. in operative eye for 1 month after surgery 5 mL 3    sennosides 8.6 mg Cap 2 tablets.      spironolactone (ALDACTONE) 50 MG tablet Take 50 mg by mouth once daily.       TEKTURNA 150 mg Tab Take 150 mg by mouth 2 (two) times a day.       TEKTURNA 300 mg Tab       timolol maleate 0.5% (TIMOPTIC-XE) 0.5 % SolG Place 1 drop into both eyes nightly.  0    traMADol (ULTRAM) 50 mg tablet       tazarotene (TAZORAC) 0.05 % Crea cream Apply topically once daily. Use on scalp for 2 days before treatment with light( PDT) 30 g 3     Current Facility-Administered Medications on File Prior to Visit   Medication Dose Route Frequency Provider Last Rate Last Admin    sodium chloride 0.9% flush 10 mL  10 mL Intravenous PRN Kan Kramer MD         "sodium chloride 0.9% flush 10 mL  10 mL Intravenous PRN Kan Kramer MD        sodium chloride 0.9% flush 10 mL  10 mL Intravenous PRN Kan Kramer MD                Review of patient's allergies indicates:  No Known Allergies                ROS:    General ROS: negative for - chills, fatigue or fever  Cardiovascular ROS: no chest pain or dyspnea on exertion  Musculoskeletal ROS: negative for - joint pain or joint stiffness   Skin: Negative for rash, Positive for nail or hair changes.  no itching.       EXAM:   Vitals:    04/26/22 1106   BP: 137/70   Pulse: 85   Weight: 81.6 kg (179 lb 14.3 oz)   Height: 5' 2" (1.575 m)        General:  alert, no distress, cooperative    Vasc:    Pedal pulses: DP 1/4 - B/L and PT 1/4 - B/L  Capillary Refill Time: Normal - B/L  Temperature: Normal - B/L  Hair Growth: decreased - B/L  Edema:  Present bilaterally      Neuro Motor:   [place] present bilaterally,   Reflexes normal bilaterally,   Tinels absent  Mulders absent  +paresthesias (Abnormal spontaneous sensations in feet)     Derm:   Hair:  Decreased   Nails: onychomycosis of the toenails and brittle  Interdigital Spaces: clean, dry and without evidence of break in skin integrity  Wounds: None  Skin thin and atrophic, dry on the soles.       Msk:    tenderness absent    masses absent  range of movement non-painful and equal  crepitation absent bilaterally, strength normal 5/5    "

## 2022-05-04 ENCOUNTER — TELEPHONE (OUTPATIENT)
Dept: PODIATRY | Facility: CLINIC | Age: 87
End: 2022-05-04
Payer: MEDICARE

## 2022-05-04 DIAGNOSIS — L85.3 DRY SKIN: ICD-10-CM

## 2022-05-04 DIAGNOSIS — B35.1 ONYCHOMYCOSIS DUE TO DERMATOPHYTE: Primary | ICD-10-CM

## 2022-05-04 RX ORDER — UREA 200 MG/G
1 CREAM TOPICAL DAILY
Qty: 75 G | Refills: 10 | Status: SHIPPED | OUTPATIENT
Start: 2022-05-04

## 2022-05-04 NOTE — TELEPHONE ENCOUNTER
----- Message from Yasmeen Dickey sent at 5/4/2022 10:11 AM CDT -----  Regarding: medication concerns  Name of Who is Calling: Michelle cuello           What is the request in detail: Marina is requesting a call back to find out the status of the cream that was suppose to be called in.           Can the clinic reply by [facility]: No           What Number to Call Back if not in [facility]: [phone]

## 2022-05-13 ENCOUNTER — TELEPHONE (OUTPATIENT)
Dept: OPHTHALMOLOGY | Facility: CLINIC | Age: 87
End: 2022-05-13
Payer: MEDICARE

## 2022-05-13 NOTE — TELEPHONE ENCOUNTER
Patient given arrival time of 11:00 am on Thursday May 19 . Nothing to eat or drink after 9 pm.  Water, Gatorade after 9 pm until leaves home.  Start drops into the operative eye today. [address]

## 2022-05-19 ENCOUNTER — HOSPITAL ENCOUNTER (OUTPATIENT)
Facility: OTHER | Age: 87
Discharge: HOME | End: 2022-05-19
Attending: OPHTHALMOLOGY | Admitting: OPHTHALMOLOGY
Payer: MEDICARE

## 2022-05-19 ENCOUNTER — ANESTHESIA (OUTPATIENT)
Dept: SURGERY | Facility: OTHER | Age: 87
End: 2022-05-19
Payer: MEDICARE

## 2022-05-19 ENCOUNTER — ANESTHESIA EVENT (OUTPATIENT)
Dept: SURGERY | Facility: OTHER | Age: 87
End: 2022-05-19
Payer: MEDICARE

## 2022-05-19 VITALS
HEIGHT: 62 IN | SYSTOLIC BLOOD PRESSURE: 124 MMHG | RESPIRATION RATE: 16 BRPM | HEART RATE: 74 BPM | DIASTOLIC BLOOD PRESSURE: 64 MMHG | WEIGHT: 179 LBS | TEMPERATURE: 98 F | OXYGEN SATURATION: 97 % | BODY MASS INDEX: 32.94 KG/M2

## 2022-05-19 DIAGNOSIS — H26.8 PSEUDOEXFOLIATION SYNDROME: ICD-10-CM

## 2022-05-19 DIAGNOSIS — H25.11 NUCLEAR SCLEROTIC CATARACT OF RIGHT EYE: Primary | ICD-10-CM

## 2022-05-19 DIAGNOSIS — H27.132 POSTERIOR DISLOCATION OF LEFT LENS: ICD-10-CM

## 2022-05-19 PROCEDURE — 37000009 HC ANESTHESIA EA ADD 15 MINS: Performed by: OPHTHALMOLOGY

## 2022-05-19 PROCEDURE — 36000706: Performed by: OPHTHALMOLOGY

## 2022-05-19 PROCEDURE — 66986 EXCHANGE LENS PROSTHESIS: CPT | Mod: LT,,, | Performed by: OPHTHALMOLOGY

## 2022-05-19 PROCEDURE — V2630 ANTER CHAMBER INTRAOCUL LENS: HCPCS | Performed by: OPHTHALMOLOGY

## 2022-05-19 PROCEDURE — 63600175 PHARM REV CODE 636 W HCPCS: Performed by: NURSE ANESTHETIST, CERTIFIED REGISTERED

## 2022-05-19 PROCEDURE — 63600175 PHARM REV CODE 636 W HCPCS: Performed by: OPHTHALMOLOGY

## 2022-05-19 PROCEDURE — 37000008 HC ANESTHESIA 1ST 15 MINUTES: Performed by: OPHTHALMOLOGY

## 2022-05-19 PROCEDURE — 67036 PR VITRECTOMY,MECHANICAL: ICD-10-PCS | Mod: 51,LT,, | Performed by: OPHTHALMOLOGY

## 2022-05-19 PROCEDURE — 25000003 PHARM REV CODE 250: Performed by: OPHTHALMOLOGY

## 2022-05-19 PROCEDURE — 36000707: Performed by: OPHTHALMOLOGY

## 2022-05-19 PROCEDURE — 66986 PR EXCHANGE LENS PROSTHESIS: ICD-10-PCS | Mod: LT,,, | Performed by: OPHTHALMOLOGY

## 2022-05-19 PROCEDURE — 67036 REMOVAL OF INNER EYE FLUID: CPT | Mod: 51,LT,, | Performed by: OPHTHALMOLOGY

## 2022-05-19 PROCEDURE — 71000015 HC POSTOP RECOV 1ST HR: Performed by: OPHTHALMOLOGY

## 2022-05-19 PROCEDURE — 27201423 OPTIME MED/SURG SUP & DEVICES STERILE SUPPLY: Performed by: OPHTHALMOLOGY

## 2022-05-19 RX ORDER — TETRACAINE HYDROCHLORIDE 5 MG/ML
1 SOLUTION OPHTHALMIC
Status: COMPLETED | OUTPATIENT
Start: 2022-05-19 | End: 2022-05-19

## 2022-05-19 RX ORDER — LIDOCAINE HYDROCHLORIDE 20 MG/ML
INJECTION, SOLUTION EPIDURAL; INFILTRATION; INTRACAUDAL; PERINEURAL
Status: DISCONTINUED | OUTPATIENT
Start: 2022-05-19 | End: 2022-05-19 | Stop reason: HOSPADM

## 2022-05-19 RX ORDER — CEFAZOLIN SODIUM 1 G/3ML
INJECTION, POWDER, FOR SOLUTION INTRAMUSCULAR; INTRAVENOUS
Status: DISCONTINUED | OUTPATIENT
Start: 2022-05-19 | End: 2022-05-19 | Stop reason: HOSPADM

## 2022-05-19 RX ORDER — TROPICAMIDE 10 MG/ML
1 SOLUTION/ DROPS OPHTHALMIC
Status: COMPLETED | OUTPATIENT
Start: 2022-05-19 | End: 2022-05-19

## 2022-05-19 RX ORDER — HYDROCODONE BITARTRATE AND ACETAMINOPHEN 5; 325 MG/1; MG/1
1 TABLET ORAL EVERY 4 HOURS PRN
Status: DISCONTINUED | OUTPATIENT
Start: 2022-05-19 | End: 2022-05-19 | Stop reason: HOSPADM

## 2022-05-19 RX ORDER — FENTANYL CITRATE 50 UG/ML
INJECTION, SOLUTION INTRAMUSCULAR; INTRAVENOUS
Status: DISCONTINUED | OUTPATIENT
Start: 2022-05-19 | End: 2022-05-19

## 2022-05-19 RX ORDER — BUPIVACAINE HYDROCHLORIDE 7.5 MG/ML
INJECTION, SOLUTION EPIDURAL; RETROBULBAR
Status: DISCONTINUED | OUTPATIENT
Start: 2022-05-19 | End: 2022-05-19 | Stop reason: HOSPADM

## 2022-05-19 RX ORDER — PHENYLEPHRINE HYDROCHLORIDE 25 MG/ML
1 SOLUTION/ DROPS OPHTHALMIC
Status: COMPLETED | OUTPATIENT
Start: 2022-05-19 | End: 2022-05-19

## 2022-05-19 RX ORDER — MOXIFLOXACIN 5 MG/ML
1 SOLUTION/ DROPS OPHTHALMIC
Status: COMPLETED | OUTPATIENT
Start: 2022-05-19 | End: 2022-05-19

## 2022-05-19 RX ORDER — ACETAMINOPHEN 325 MG/1
650 TABLET ORAL EVERY 4 HOURS PRN
Status: DISCONTINUED | OUTPATIENT
Start: 2022-05-19 | End: 2022-05-19 | Stop reason: HOSPADM

## 2022-05-19 RX ORDER — MIDAZOLAM HYDROCHLORIDE 1 MG/ML
INJECTION INTRAMUSCULAR; INTRAVENOUS
Status: DISCONTINUED | OUTPATIENT
Start: 2022-05-19 | End: 2022-05-19

## 2022-05-19 RX ORDER — DEXAMETHASONE SODIUM PHOSPHATE 4 MG/ML
INJECTION, SOLUTION INTRA-ARTICULAR; INTRALESIONAL; INTRAMUSCULAR; INTRAVENOUS; SOFT TISSUE
Status: DISCONTINUED | OUTPATIENT
Start: 2022-05-19 | End: 2022-05-19 | Stop reason: HOSPADM

## 2022-05-19 RX ADMIN — MOXIFLOXACIN 1 DROP: 5 SOLUTION/ DROPS OPHTHALMIC at 11:05

## 2022-05-19 RX ADMIN — FENTANYL CITRATE 100 MCG: 50 INJECTION, SOLUTION INTRAMUSCULAR; INTRAVENOUS at 01:05

## 2022-05-19 RX ADMIN — TETRACAINE HYDROCHLORIDE 1 DROP: 5 SOLUTION OPHTHALMIC at 11:05

## 2022-05-19 RX ADMIN — TROPICAMIDE 1 DROP: 10 SOLUTION/ DROPS OPHTHALMIC at 12:05

## 2022-05-19 RX ADMIN — MIDAZOLAM HYDROCHLORIDE 0.5 MG: 1 INJECTION, SOLUTION INTRAMUSCULAR; INTRAVENOUS at 01:05

## 2022-05-19 RX ADMIN — PHENYLEPHRINE HYDROCHLORIDE 1 DROP: 25 SOLUTION/ DROPS OPHTHALMIC at 11:05

## 2022-05-19 RX ADMIN — PHENYLEPHRINE HYDROCHLORIDE 1 DROP: 25 SOLUTION/ DROPS OPHTHALMIC at 12:05

## 2022-05-19 RX ADMIN — TROPICAMIDE 1 DROP: 10 SOLUTION/ DROPS OPHTHALMIC at 11:05

## 2022-05-19 RX ADMIN — TETRACAINE HYDROCHLORIDE 1 DROP: 5 SOLUTION OPHTHALMIC at 12:05

## 2022-05-19 NOTE — ANESTHESIA PREPROCEDURE EVALUATION
05/19/2022  Franklin Perez is a 89 y.o., male.    Pre-op Assessment    I have reviewed the Patient Summary Reports.    I have reviewed the Nursing Notes. I have reviewed the NPO Status.   I have reviewed the Medications.     Review of Systems  Anesthesia Hx:  No problems with previous Anesthesia    Social:  Non-Smoker    Cardiovascular:   Hypertension, well controlled    Pulmonary:  Pulmonary Normal    Hepatic/GI:  Hepatic/GI Normal    Endocrine:  Endocrine Normal    Psych:   Psychiatric History          Physical Exam  General:  Obesity      Airway/Jaw/Neck:  Airway Findings: Mouth Opening: Normal   Tongue: Normal   General Airway Assessment: Adult Mallampati: II  TM Distance: Normal, at least 6 cm   Jaw/Neck Findings:     Neck ROM: Normal ROM       Dental:  Dental Findings: In tact          Mental Status:  Mental Status Findings:  Cooperative, Alert and Oriented         Anesthesia Plan  Type of Anesthesia, risks & benefits discussed:  Anesthesia Type:  MAC    Patient's Preference:   Plan Factors:          Intra-op Monitoring Plan: standard ASA monitors  Intra-op Monitoring Plan Comments:   Post Op Pain Control Plan: multimodal analgesia  Post Op Pain Control Plan Comments:     Induction:   IV  Beta Blocker:         Informed Consent: Informed consent signed with the Patient and all parties understand the risks and agree with anesthesia plan.  All questions answered.  Anesthesia consent signed with patient.  ASA Score: 3     Day of Surgery Review of History & Physical:              Ready For Surgery From Anesthesia Perspective.           Physical Exam  General: Obesity    Airway:  Mallampati: II   Mouth Opening: Normal  TM Distance: Normal, at least 6 cm  Tongue: Normal  Neck ROM: Normal ROM    Dental:  In tact          Anesthesia Plan  Type of Anesthesia, risks & benefits discussed:    Anesthesia Type:  MAC  Intra-op Monitoring Plan: standard ASA monitors  Post Op Pain Control Plan: multimodal analgesia  Induction:  IV  Informed Consent: Informed consent signed with the Patient and all parties understand the risks and agree with anesthesia plan.  All questions answered.   ASA Score: 3    Ready For Surgery From Anesthesia Perspective.       .

## 2022-05-19 NOTE — DISCHARGE INSTRUCTIONS
Anesthesia: Monitored Anesthesia Care (MAC)    Anesthesia Safety  Have an adult family member or friend drive you home after the procedure.  For the first 24 hours after your surgery:  Do not drive or use heavy equipment.  Do not make important decisions or sign documents.  Avoid alcohol.  Have someone stay with you, if possible. They can watch for problems and help keep you safe.     A

## 2022-05-19 NOTE — ANESTHESIA POSTPROCEDURE EVALUATION
Anesthesia Post Evaluation    Patient: Franklin Perez    Procedure(s) Performed: Procedure(s) (LRB):  VITRECTOMY, PARS PLANA APPROACH (Left)  REPLACEMENT, IOL (Left)    Final Anesthesia Type: MAC      Patient location during evaluation: [facility]  Patient participation: Yes- Able to Participate  Level of consciousness: awake and alert  Post-procedure vital signs: reviewed and stable (B/P-128/58, HR-70, RR-20, O2%-95)  Pain management: adequate  Airway patency: patent    PONV status at discharge: No PONV  Anesthetic complications: no      Cardiovascular status: hemodynamically stable  Respiratory status: unassisted, room air and spontaneous ventilation  Hydration status: euvolemic  Follow-up not needed.          Vitals Value Taken Time   /72 05/19/22 1144   Temp 36.7 °C (98 °F) 05/19/22 1144   Pulse 86 05/19/22 1144   Resp 16 05/19/22 1144   SpO2 94 % 05/19/22 1144         No case tracking events are documented in the log.      Pain/Odell Score: No data recorded

## 2022-05-19 NOTE — DISCHARGE SUMMARY
Outcome: Successful outpatient ophthalmic surgical procedure  Preprinted Instructions given to patient.  Regular diet.  Activity: No restrictions  Meds: see Med Rec  Condition: stable  Follow up: 1 day with Dr Kramer  Disposition: Home  Diagnosis: s/p eye surgery

## 2022-05-19 NOTE — PLAN OF CARE
Franklin Chris has met all discharge criteria from Phase II. Vital Signs are stable, ambulating  without difficulty. Discharge instructions given, patient verbalized understanding. Discharged from facility via wheelchair in stable condition. Instructions reviewed with family and family friend Kayla naranjo. All verbalized understanding

## 2022-05-19 NOTE — OP NOTE
SURGEON:  Kan Kramer M.D.    PREOPERATIVE DIAGNOSIS:    Dislocated IOL left Eye    POSTOPERATIVE DIAGNOSIS:    Dislocated IOL left Eye    PROCEDURES:    1) Pars Plana vitrectomy  2) Dislocated IOL removal   3) Secondary ACIOL implantation   4) Subconjunctival injection of antibiotic and steroid     DATE OF SURGERY: 5/19/2022      ANESTHESIA:  MAC with Retrobulbar block    COMPLICATIONS:  None    ESTIMATED BLOOD LOSS: None    SPECIMENS: None    INDICATIONS:    The patient has a history of painless progressive visual loss and  difficulty with activities of daily living secondary to dislocation of the IOL.  After a thorough discussion of the risks, benefits, and alternatives to surgery, including, but not limited to, the rare risks of infection, retinal detachment, hemorrhage, need for additional surgery, loss of vision, and even loss of the eye, the patient voices understanding and desires to proceed.    DESCRIPTION OF PROCEDURE:       After verification and marking of the proper eye in the preop holding area, the patient was brought to the operating room in supine position where the eye was prepped and draped in standard sterile fashion with 5% Betadine and a lid speculum placed in the eye.   A retrobulbar block was used in addition to the preoperative anesthesia and the procedure was begun by the creation of 2 paracentesis incisions 180 degrees apart, through which viscoelastic was used to fill the anterior chamber.  Next, crescent and keratome blades were used to create a triplanar superior scleral tunnel incision. A 23G trocar was introduced into the Pars Plana, and a thorough vitrectomy was performed around the IOL. The IOl was stabilized with a 10-0 prolene suture.  The IOL was then removed with the capsular bad through the scleral tunnel incision.  The new IOl was introduced into the AC. Once fixation was achieved, the IOL was found to be centered with minimal pupil irregularity.   All remaining  viscoelastics were removed from the eye and at the end of the case the pupil was round, the lens was well-centered in the AC and stable and all wounds were found to be water tight. Subconjuncitval injection of antibiotics and steroid were administered, and the patient was patched. The patient will follow up with Dr. Kramer in the morning.

## 2022-05-20 ENCOUNTER — OFFICE VISIT (OUTPATIENT)
Dept: OPHTHALMOLOGY | Facility: CLINIC | Age: 87
End: 2022-05-20
Attending: OPHTHALMOLOGY
Payer: MEDICARE

## 2022-05-20 DIAGNOSIS — H27.132 POSTERIOR DISLOCATION OF LEFT LENS: ICD-10-CM

## 2022-05-20 DIAGNOSIS — Z98.890 POST-OPERATIVE STATE: Primary | ICD-10-CM

## 2022-05-20 PROCEDURE — 99024 POSTOP FOLLOW-UP VISIT: CPT | Mod: POP,,, | Performed by: OPHTHALMOLOGY

## 2022-05-20 PROCEDURE — 99999 PR PBB SHADOW E&M-EST. PATIENT-LVL III: ICD-10-PCS | Mod: PBBFAC,,, | Performed by: OPHTHALMOLOGY

## 2022-05-20 PROCEDURE — 99024 PR POST-OP FOLLOW-UP VISIT: ICD-10-PCS | Mod: POP,,, | Performed by: OPHTHALMOLOGY

## 2022-05-20 PROCEDURE — 99213 OFFICE O/P EST LOW 20 MIN: CPT | Mod: PBBFAC | Performed by: OPHTHALMOLOGY

## 2022-05-20 PROCEDURE — 99999 PR PBB SHADOW E&M-EST. PATIENT-LVL III: CPT | Mod: PBBFAC,,, | Performed by: OPHTHALMOLOGY

## 2022-05-20 RX ORDER — IRBESARTAN 150 MG/1
150 TABLET ORAL DAILY
COMMUNITY
Start: 2022-04-26

## 2022-05-20 NOTE — PROGRESS NOTES
HPI     Post-op Evaluation     Comments: 1 day po OS              Comments     Patient states slight pain last night but didn't take any pain   medications. Vision is still blurry this morning.    PGB TID OS  Timolol QHS OU    08/02/2021 IMPLANT: diboo 23.0 OD   5/19/2022 1) Pars [place]ana vitrectomy OS                    2) Dislocated IOL removal OS                    3) Secondary ACIOL implantation OS                    4) Subconjunctival injection of antibiotic and steroid   OS             Last edited by Bri Walker MA on 5/20/2022 10:03 AM. (History)            Assessment /Plan     For exam results, see Encounter Report.    Post-operative state    Posterior dislocation of left lens      Slit lamp exam:  L/L: nl  K: tr cell, wound sealed  AC: 1+ cell  Lens: ACIOL centered and stable    POD1 s/p PPV/ACIOL  Appropriate precautions and post op medications reviewed.  Patient instructed to call or come in if symptoms of redness, decreased vision, or pain are experienced.

## 2022-05-26 ENCOUNTER — OFFICE VISIT (OUTPATIENT)
Dept: OPHTHALMOLOGY | Facility: CLINIC | Age: 87
End: 2022-05-26
Attending: OPHTHALMOLOGY
Payer: MEDICARE

## 2022-05-26 DIAGNOSIS — Z98.890 POST-OPERATIVE STATE: Primary | ICD-10-CM

## 2022-05-26 PROCEDURE — 99024 PR POST-OP FOLLOW-UP VISIT: ICD-10-PCS | Mod: POP,,, | Performed by: OPHTHALMOLOGY

## 2022-05-26 PROCEDURE — 99999 PR PBB SHADOW E&M-EST. PATIENT-LVL III: CPT | Mod: PBBFAC,,, | Performed by: OPHTHALMOLOGY

## 2022-05-26 PROCEDURE — 99999 PR PBB SHADOW E&M-EST. PATIENT-LVL III: ICD-10-PCS | Mod: PBBFAC,,, | Performed by: OPHTHALMOLOGY

## 2022-05-26 PROCEDURE — 99024 POSTOP FOLLOW-UP VISIT: CPT | Mod: POP,,, | Performed by: OPHTHALMOLOGY

## 2022-05-26 PROCEDURE — 99213 OFFICE O/P EST LOW 20 MIN: CPT | Mod: PBBFAC | Performed by: OPHTHALMOLOGY

## 2022-05-26 NOTE — PROGRESS NOTES
HPI     89 y/o is here for 1 week po PPV/ACIOL OS    PGB TID OS  Timolol QHS OU    08/02/2021 IMPLANT: diboo 23.0 OD   5/19/2022 1) Pars Plana vitrectomy OS                    2) Dislocated IOL removal OS                    3) Secondary ACIOL implantation OS                    4) Subconjunctival injection of antibiotic and steroid   OS       Last edited by Hoda Brooks on 5/26/2022  1:58 PM. (History)            Assessment /Plan     For exam results, see Encounter Report.    Post-operative state      Slit lamp exam:  L/L: nl  K: clear, wound sealed  AC: trace cell  Iris/Lens: ACIOL centered and stable    POW1 s/p phaco: Surgery healing well with no signs of infection or abnormal inflammation.   Myopic result (has monovision now)

## 2022-06-07 ENCOUNTER — OFFICE VISIT (OUTPATIENT)
Dept: PODIATRY | Facility: CLINIC | Age: 87
End: 2022-06-07
Payer: MEDICARE

## 2022-06-07 VITALS
WEIGHT: 179 LBS | SYSTOLIC BLOOD PRESSURE: 147 MMHG | BODY MASS INDEX: 32.94 KG/M2 | HEIGHT: 62 IN | HEART RATE: 88 BPM | DIASTOLIC BLOOD PRESSURE: 68 MMHG

## 2022-06-07 DIAGNOSIS — B35.1 DERMATOPHYTOSIS OF NAIL: ICD-10-CM

## 2022-06-07 DIAGNOSIS — G60.9 IDIOPATHIC PERIPHERAL NEUROPATHY: Primary | ICD-10-CM

## 2022-06-07 PROCEDURE — 99213 OFFICE O/P EST LOW 20 MIN: CPT | Mod: S$PBB,,, | Performed by: PODIATRIST

## 2022-06-07 PROCEDURE — 99999 PR PBB SHADOW E&M-EST. PATIENT-LVL V: CPT | Mod: PBBFAC,,, | Performed by: PODIATRIST

## 2022-06-07 PROCEDURE — 99215 OFFICE O/P EST HI 40 MIN: CPT | Mod: PBBFAC,PN | Performed by: PODIATRIST

## 2022-06-07 PROCEDURE — 99213 PR OFFICE/OUTPT VISIT, EST, LEVL III, 20-29 MIN: ICD-10-PCS | Mod: S$PBB,,, | Performed by: PODIATRIST

## 2022-06-07 PROCEDURE — 99999 PR PBB SHADOW E&M-EST. PATIENT-LVL V: ICD-10-PCS | Mod: PBBFAC,,, | Performed by: PODIATRIST

## 2022-06-07 NOTE — PROGRESS NOTES
Chief Complaint   Patient presents with    Follow-up     Foot Exam/PCP Yahir Russo Jr., MD 05/31/2021         MEDICAL DECISION MAKING:       Problem List Items Addressed This Visit    None     Visit Diagnoses     Idiopathic peripheral neuropathy    -  Primary    Dermatophytosis of nail                · I counseled the patient on the patient's conditions, their implications and medical management.   Shoe inspection.     Maintain proper foot hygiene.   Continue wearing proper shoe gear, daily foot inspections, never walking without protective shoe gear, never putting sharp instruments to feet.  Nails trimmed as courtesy.  He has not seen PCP in over a year.          HPI:   Franklin Perez is a 89 y.o. male who presents to clinic with concerns of fungal toenails.      PCP:  Yahir Russo Jr, MD   CC: Follow-up (Foot Exam/PCP Yahir Russo Jr., MD 05/31/2021)           Patient Active Problem List   Diagnosis    AK (actinic keratosis)    Pseudoexfoliation syndrome    Nuclear sclerotic cataract of right eye    Posterior dislocation of left lens           Current Outpatient Medications on File Prior to Visit   Medication Sig Dispense Refill    aspirin (ECOTRIN) 81 MG EC tablet Take 81 mg by mouth once daily.      BYSTOLIC 10 mg Tab Take 10 mg by mouth 2 (two) times a day.   0    ciclopirox (LOPROX) 0.77 % Crea Apply topically 2 (two) times daily. 90 g 2    dicyclomine (BENTYL) 20 mg tablet Take 20 mg by mouth 2 (two) times daily.   0    fish oil-omega-3 fatty acids 300-1,000 mg capsule Take 2 g by mouth once daily.      fluorouracil (EFUDEX) 5 % cream Use hs for 2 weeks 40 g 3    ginkgo biloba leaf extract 60 mg Tab Take by mouth.      hydrALAZINE (APRESOLINE) 50 MG tablet Take 50 mg by mouth.       irbesartan (AVAPRO) 150 MG tablet Take 150 mg by mouth once daily.      LIDOcaine HCL 2% (XYLOCAINE) 2 % jelly Apply topically as needed. Apply topically once nightly to affected part of foot/feet. 30 mL 2  "   meclizine (ANTIVERT) 25 mg tablet       multivitamin-iron-folic acid Tab 1 capsule.      prednisolon/gatiflox/bromfenac (PREDNISOL ACE-GATIFLOX-BROMFEN) 1-0.5-0.075 % DrpS Apply 1 drop to eye 3 (three) times daily. in operative eye for 1 month after surgery 5 mL 3    sennosides 8.6 mg Cap 2 tablets.      spironolactone (ALDACTONE) 50 MG tablet Take 50 mg by mouth once daily.       TEKTURNA 150 mg Tab Take 150 mg by mouth 2 (two) times a day.       TEKTURNA 300 mg Tab       timolol maleate 0.5% (TIMOPTIC-XE) 0.5 % SolG Place 1 drop into both eyes nightly.  0    traMADol (ULTRAM) 50 mg tablet       urea 20 % Crea Apply 1 application topically once daily. To dry skin on the feet and thick toenails. 75 g 10    furosemide (LASIX) 20 MG tablet Take 20 mg by mouth.      tazarotene (TAZORAC) 0.05 % Crea cream Apply topically once daily. Use on scalp for 2 days before treatment with light( PDT) 30 g 3     Current Facility-Administered Medications on File Prior to Visit   Medication Dose Route Frequency Provider Last Rate Last Admin    sodium chloride 0.9% flush 10 mL  10 mL Intravenous PRN Kan Kramer MD        sodium chloride 0.9% flush 10 mL  10 mL Intravenous PRN Kan Kramer MD        sodium chloride 0.9% flush 10 mL  10 mL Intravenous PRN Kan Kramer MD                Review of patient's allergies indicates:   Allergen Reactions    Aspirin Other (See Comments)     Other reaction(s): Acid reflux                   ROS:    General ROS: negative for - chills, fatigue or fever  Cardiovascular ROS: no chest pain or dyspnea on exertion  Musculoskeletal ROS: negative for - joint pain or joint stiffness   Skin: Negative for rash, Positive for nail or hair changes.  no itching.       EXAM:   Vitals:    06/07/22 1129   BP: (!) 147/68   Pulse: 88   Weight: 81.2 kg (179 lb)   Height: 5' 2" (1.575 m)        General:  alert, no distress, cooperative    Vasc:    Pedal pulses: DP 1/4 - B/L and PT 1/4 - " B/L  Capillary Refill Time: Normal - B/L  Temperature: Normal - B/L  Hair Growth: decreased - B/L  Edema:  Present bilaterally      Neuro Motor:   [place] present bilaterally,   Reflexes normal bilaterally,   Tinels absent  Mulders absent  +paresthesias (Abnormal spontaneous sensations in feet)     Derm:   Hair:  Decreased   Nails: onychomycosis of the toenails and brittle  Interdigital Spaces: clean, dry and without evidence of break in skin integrity  Wounds: None  Skin thin and atrophic, dry on the soles.       Msk:    tenderness absent    masses absent  range of movement non-painful and equal  crepitation absent bilaterally, strength normal 5/5

## 2022-06-09 ENCOUNTER — OFFICE VISIT (OUTPATIENT)
Dept: OPTOMETRY | Facility: CLINIC | Age: 87
End: 2022-06-09
Payer: MEDICARE

## 2022-06-09 DIAGNOSIS — Z98.42 S/P BILATERAL CATARACT EXTRACTION: Primary | ICD-10-CM

## 2022-06-09 DIAGNOSIS — Z98.41 S/P BILATERAL CATARACT EXTRACTION: Primary | ICD-10-CM

## 2022-06-09 PROCEDURE — 99999 PR PBB SHADOW E&M-EST. PATIENT-LVL III: CPT | Mod: PBBFAC,,, | Performed by: OPTOMETRIST

## 2022-06-09 PROCEDURE — 99213 OFFICE O/P EST LOW 20 MIN: CPT | Mod: PBBFAC | Performed by: OPTOMETRIST

## 2022-06-09 PROCEDURE — 92134 CPTRZ OPH DX IMG PST SGM RTA: CPT | Mod: PBBFAC | Performed by: OPTOMETRIST

## 2022-06-09 PROCEDURE — 99024 PR POST-OP FOLLOW-UP VISIT: ICD-10-PCS | Mod: POP,,, | Performed by: OPTOMETRIST

## 2022-06-09 PROCEDURE — 99999 PR PBB SHADOW E&M-EST. PATIENT-LVL III: ICD-10-PCS | Mod: PBBFAC,,, | Performed by: OPTOMETRIST

## 2022-06-09 PROCEDURE — 99024 POSTOP FOLLOW-UP VISIT: CPT | Mod: POP,,, | Performed by: OPTOMETRIST

## 2022-06-09 NOTE — PROGRESS NOTES
HPI     89 y/o is here for 1 month po PPV/ACIOL    PGB TID OS  Timolol QHS OU    08/02/2021 IMPLANT: diboo 23.0 OD   5/19/2022 1) Pars Plana vitrectomy OS                    2) Dislocated IOL removal OS                    3) Secondary ACIOL implantation OS                    4) Subconjunctival injection of antibiotic and steroid   OS           Last edited by Gabriel Sommer on 6/9/2022 10:39 AM. (History)            Assessment /Plan     For exam results, see Encounter Report.    S/P bilateral cataract extraction  -     OCT- Retina            1.  Overall pt doing well.  No rx given.  Patient able to see well at distance and near without glasses--monovision.  Patient complains of seeing halos around headlights at night--educated pt this is normal.  No CME OU.  Retina flat and intact OU--no holes, tears, breaks, or RDs.

## 2022-06-22 ENCOUNTER — TELEPHONE (OUTPATIENT)
Dept: OPHTHALMOLOGY | Facility: CLINIC | Age: 87
End: 2022-06-22
Payer: MEDICARE

## 2022-06-22 NOTE — TELEPHONE ENCOUNTER
----- Message from Reina Vasquez sent at 6/22/2022  9:36 AM CDT -----  Regarding: Requesting Call back  Patients Interpretor asked to speak to office about pt having swelling in eyes after surgery.         Requesting Call back number:[phone] Kayla

## 2022-06-22 NOTE — TELEPHONE ENCOUNTER
----- Message from Reina Vasquez sent at 6/22/2022  9:36 AM CDT -----  Regarding: Requesting Call back  Patients Interpretor asked to speak to office about pt having swelling in eyes after surgery.         Requesting Call back number:[phone] Kayla

## 2022-06-24 ENCOUNTER — OFFICE VISIT (OUTPATIENT)
Dept: OPHTHALMOLOGY | Facility: CLINIC | Age: 87
End: 2022-06-24
Attending: OPHTHALMOLOGY
Payer: MEDICARE

## 2022-06-24 DIAGNOSIS — H00.014 HORDEOLUM EXTERNUM OF LEFT UPPER EYELID: Primary | ICD-10-CM

## 2022-06-24 PROCEDURE — 99214 OFFICE O/P EST MOD 30 MIN: CPT | Mod: PBBFAC | Performed by: OPHTHALMOLOGY

## 2022-06-24 PROCEDURE — 92012 INTRM OPH EXAM EST PATIENT: CPT | Mod: 24,S$PBB,, | Performed by: OPHTHALMOLOGY

## 2022-06-24 PROCEDURE — 92012 PR EYE EXAM, EST PATIENT,INTERMED: ICD-10-PCS | Mod: 24,S$PBB,, | Performed by: OPHTHALMOLOGY

## 2022-06-24 PROCEDURE — 99999 PR PBB SHADOW E&M-EST. PATIENT-LVL IV: CPT | Mod: PBBFAC,,, | Performed by: OPHTHALMOLOGY

## 2022-06-24 PROCEDURE — 99999 PR PBB SHADOW E&M-EST. PATIENT-LVL IV: ICD-10-PCS | Mod: PBBFAC,,, | Performed by: OPHTHALMOLOGY

## 2022-06-24 RX ORDER — NEOMYCIN SULFATE, POLYMYXIN B SULFATE, AND DEXAMETHASONE 3.5; 10000; 1 MG/G; [USP'U]/G; MG/G
OINTMENT OPHTHALMIC EVERY 6 HOURS
Qty: 3.5 G | Refills: 3 | Status: SHIPPED | OUTPATIENT
Start: 2022-06-24 | End: 2022-07-04

## 2022-06-24 NOTE — PROGRESS NOTES
HPI     Patient presents with pain and swelling in the left eye after cataract   surgery. Patient notes pain to the touch. Patient notes right eye as   stable.     Last edited by Gabriel Sommer on 6/24/2022 10:46 AM. (History)            Assessment /Plan     For exam results, see Encounter Report.    Hordeolum externum of left upper eyelid    Other orders  -     neomycin-polymyxin-dexamethasone (DEXACINE) 3.5 mg/g-10,000 unit/g-0.1 % Oint; Place into the left eye every 6 (six) hours. for 10 days  Dispense: 3.5 g; Refill: 3      Maxitrol hector tid

## 2022-08-05 ENCOUNTER — TELEPHONE (OUTPATIENT)
Dept: OPTOMETRY | Facility: CLINIC | Age: 87
End: 2022-08-05
Payer: MEDICARE

## 2022-08-05 NOTE — TELEPHONE ENCOUNTER
----- Message from Yojana Vazquez sent at 8/5/2022  1:20 PM CDT -----  Regarding: recheck appt  Contact: Pt  Pt need a recheck appt asanatividad.    Please call Kayla [phone]

## 2022-08-10 ENCOUNTER — OFFICE VISIT (OUTPATIENT)
Dept: OPTOMETRY | Facility: CLINIC | Age: 87
End: 2022-08-10
Payer: MEDICARE

## 2022-08-10 DIAGNOSIS — H53.10 SUBJECTIVE VISUAL DISTURBANCE: Primary | ICD-10-CM

## 2022-08-10 PROCEDURE — 99499 UNLISTED E&M SERVICE: CPT | Mod: S$PBB,,, | Performed by: OPTOMETRIST

## 2022-08-10 PROCEDURE — 99999 PR PBB SHADOW E&M-EST. PATIENT-LVL III: CPT | Mod: PBBFAC,,, | Performed by: OPTOMETRIST

## 2022-08-10 PROCEDURE — 99213 OFFICE O/P EST LOW 20 MIN: CPT | Mod: PBBFAC | Performed by: OPTOMETRIST

## 2022-08-10 PROCEDURE — 99999 PR PBB SHADOW E&M-EST. PATIENT-LVL III: ICD-10-PCS | Mod: PBBFAC,,, | Performed by: OPTOMETRIST

## 2022-08-10 PROCEDURE — 99499 NO LOS: ICD-10-PCS | Mod: S$PBB,,, | Performed by: OPTOMETRIST

## 2022-08-10 NOTE — PROGRESS NOTES
HPI     Patient is here for RX check  Patient complaints of unable to see out of glasses near and distance    Last edited by ROEL Topete on 8/10/2022 11:17 AM. (History)            Assessment /Plan     For exam results, see Encounter Report.    Subjective visual disturbance      MONITOR. ED PT ON ALL EXAM FINDINGS  UPDATE SPECS; STABLE DISTANCE AND INCREASED NEAR SPECS  H/O ARMD OU; ED PT ON IMPACTS OF VISION  CONTINUE WITH DR. JACOBSEN FOR F/U AND ROUTINE CARE

## 2022-08-22 ENCOUNTER — OFFICE VISIT (OUTPATIENT)
Dept: PODIATRY | Facility: CLINIC | Age: 87
End: 2022-08-22
Payer: MEDICARE

## 2022-08-22 VITALS
HEART RATE: 86 BPM | HEIGHT: 62 IN | SYSTOLIC BLOOD PRESSURE: 148 MMHG | BODY MASS INDEX: 32.94 KG/M2 | WEIGHT: 179 LBS | DIASTOLIC BLOOD PRESSURE: 67 MMHG

## 2022-08-22 DIAGNOSIS — G60.9 IDIOPATHIC PERIPHERAL NEUROPATHY: Primary | ICD-10-CM

## 2022-08-22 DIAGNOSIS — B35.1 DERMATOPHYTOSIS OF NAIL: ICD-10-CM

## 2022-08-22 DIAGNOSIS — R60.0 EDEMA LEG: ICD-10-CM

## 2022-08-22 PROCEDURE — 99213 PR OFFICE/OUTPT VISIT, EST, LEVL III, 20-29 MIN: ICD-10-PCS | Mod: 25,S$PBB,, | Performed by: PODIATRIST

## 2022-08-22 PROCEDURE — 99999 PR PBB SHADOW E&M-EST. PATIENT-LVL V: CPT | Mod: PBBFAC,,, | Performed by: PODIATRIST

## 2022-08-22 PROCEDURE — 11721 ROUTINE FOOT CARE: ICD-10-PCS | Mod: Q9,S$PBB,, | Performed by: PODIATRIST

## 2022-08-22 PROCEDURE — 99999 PR PBB SHADOW E&M-EST. PATIENT-LVL V: ICD-10-PCS | Mod: PBBFAC,,, | Performed by: PODIATRIST

## 2022-08-22 PROCEDURE — 99213 OFFICE O/P EST LOW 20 MIN: CPT | Mod: 25,S$PBB,, | Performed by: PODIATRIST

## 2022-08-22 PROCEDURE — 99215 OFFICE O/P EST HI 40 MIN: CPT | Mod: PBBFAC,PN | Performed by: PODIATRIST

## 2022-08-22 PROCEDURE — 11721 DEBRIDE NAIL 6 OR MORE: CPT | Mod: Q9,PBBFAC,PN | Performed by: PODIATRIST

## 2022-08-22 RX ORDER — AMOXICILLIN 500 MG/1
500 CAPSULE ORAL 3 TIMES DAILY
COMMUNITY
Start: 2022-08-08

## 2022-08-22 RX ORDER — CLOTRIMAZOLE AND BETAMETHASONE DIPROPIONATE 10; .64 MG/G; MG/G
CREAM TOPICAL DAILY
Qty: 45 G | Refills: 3 | Status: SHIPPED | OUTPATIENT
Start: 2022-08-22 | End: 2023-12-06

## 2022-08-22 NOTE — PROGRESS NOTES
Chief Complaint   Patient presents with    Follow-up     Foot Exam/PCP Yahir Russo Jr., MD  05/31/21         MEDICAL DECISION MAKING:       Problem List Items Addressed This Visit    None     Visit Diagnoses     Idiopathic peripheral neuropathy    -  Primary    Dermatophytosis of nail        Relevant Medications    clotrimazole-betamethasone 1-0.05% (LOTRISONE) cream    Edema leg                · I counseled the patient on the patient's conditions, their implications and medical management.   Shoe inspection.     Maintain proper foot hygiene.   Continue wearing proper shoe gear, daily foot inspections, never walking without protective shoe gear, never putting sharp instruments to feet.          HPI:   Franklin Perez is a 89 y.o. male who presents to clinic with concerns of fungal toenails.      PCP:  Yahir Russo Jr, MD   CC: Follow-up (Foot Exam/PCP Yahir Russo Jr., MD  05/31/21)           Patient Active Problem List   Diagnosis    AK (actinic keratosis)    Pseudoexfoliation syndrome    Nuclear sclerotic cataract of right eye    Posterior dislocation of left lens           Current Outpatient Medications on File Prior to Visit   Medication Sig Dispense Refill    amoxicillin (AMOXIL) 500 MG capsule Take 500 mg by mouth 3 (three) times daily.      aspirin (ECOTRIN) 81 MG EC tablet Take 81 mg by mouth once daily.      BYSTOLIC 10 mg Tab Take 10 mg by mouth 2 (two) times a day.   0    ciclopirox (LOPROX) 0.77 % Crea Apply topically 2 (two) times daily. 90 g 2    dicyclomine (BENTYL) 20 mg tablet Take 20 mg by mouth 2 (two) times daily.   0    fish oil-omega-3 fatty acids 300-1,000 mg capsule Take 2 g by mouth once daily.      fluorouracil (EFUDEX) 5 % cream Use hs for 2 weeks 40 g 3    ginkgo biloba leaf extract 60 mg Tab Take by mouth.      hydrALAZINE (APRESOLINE) 50 MG tablet Take 50 mg by mouth.       irbesartan (AVAPRO) 150 MG tablet Take 150 mg by mouth once daily.      LIDOcaine HCL 2%  "(XYLOCAINE) 2 % jelly Apply topically as needed. Apply topically once nightly to affected part of foot/feet. 30 mL 2    meclizine (ANTIVERT) 25 mg tablet       multivitamin-iron-folic acid Tab 1 capsule.      prednisolon/gatiflox/bromfenac (PREDNISOL ACE-GATIFLOX-BROMFEN) 1-0.5-0.075 % DrpS Apply 1 drop to eye 3 (three) times daily. in operative eye for 1 month after surgery 5 mL 3    sennosides 8.6 mg Cap 2 tablets.      spironolactone (ALDACTONE) 50 MG tablet Take 50 mg by mouth once daily.       TEKTURNA 150 mg Tab Take 150 mg by mouth 2 (two) times a day.       TEKTURNA 300 mg Tab       timolol maleate 0.5% (TIMOPTIC-XE) 0.5 % SolG Place 1 drop into both eyes nightly.  0    traMADol (ULTRAM) 50 mg tablet       urea 20 % Crea Apply 1 application topically once daily. To dry skin on the feet and thick toenails. 75 g 10    furosemide (LASIX) 20 MG tablet Take 20 mg by mouth.      tazarotene (TAZORAC) 0.05 % Crea cream Apply topically once daily. Use on scalp for 2 days before treatment with light( PDT) 30 g 3     Current Facility-Administered Medications on File Prior to Visit   Medication Dose Route Frequency Provider Last Rate Last Admin    sodium chloride 0.9% flush 10 mL  10 mL Intravenous PRN Kan Kramer MD        sodium chloride 0.9% flush 10 mL  10 mL Intravenous PRN Kan Kramer MD        sodium chloride 0.9% flush 10 mL  10 mL Intravenous PRN Kan Kramer MD                Review of patient's allergies indicates:   Allergen Reactions    Aspirin Other (See Comments)     Other reaction(s): Acid reflux                   ROS:    General ROS: negative for - chills, fatigue or fever  Cardiovascular ROS: no chest pain or dyspnea on exertion  Musculoskeletal ROS: negative for - joint pain or joint stiffness   Skin: Negative for rash, Positive for nail or hair changes.  no itching.       EXAM:   Vitals:    08/22/22 1110   BP: (!) 148/67   Pulse: 86   Weight: 81.2 kg (179 lb)   Height: 5' 2" " (1.575 m)        General:  alert, no distress, cooperative    Vasc:    Pedal pulses: DP 1/4 - B/L and PT 1/4 - B/L  Capillary Refill Time: Normal - B/L  Temperature: Normal - B/L  Hair Growth: decreased - B/L  Edema:  Present bilaterally      Neuro Motor:   [place] present bilaterally,   Reflexes normal bilaterally,   Tinels absent  Mulders absent  +paresthesias (Abnormal spontaneous sensations in feet)     Derm:   Hair:  Decreased   Nails: onychomycosis of the toenails and brittle  Interdigital Spaces: clean, dry and without evidence of break in skin integrity  Wounds: None  Skin thin and atrophic, dry on the soles.       Msk:    tenderness absent    masses absent  range of movement non-painful and equal  crepitation absent bilaterally, strength normal 5/5

## 2022-08-22 NOTE — PROGRESS NOTES
Routine Foot Care    Date/Time: 8/22/2022 11:15 AM  Performed by: Astrid Rodriguez DPM  Authorized by: Astrid Rodriguez DPM     Consent Done?:  Yes (Verbal)    Nail Care Type:  Debride  Location(s): All  (Left 1st Toe, Left 3rd Toe, Left 2nd Toe, Left 4th Toe, Left 5th Toe, Right 1st Toe, Right 2nd Toe, Right 3rd Toe, Right 4th Toe and Right 5th Toe)  Patient tolerance:  Patient tolerated the procedure well with no immediate complications     With patient's permission, the toenails mentioned above were reduced and debrided using a nail nipper, removing offending nail and debris. The patient will continue to monitor the areas daily, inspect the feet, wear protective shoe gear when ambulatory, and moisturizer to maintain skin integrity.

## 2022-11-01 ENCOUNTER — TELEPHONE (OUTPATIENT)
Dept: PSYCHIATRY | Facility: CLINIC | Age: 87
End: 2022-11-01
Payer: MEDICARE

## 2022-11-02 ENCOUNTER — OFFICE VISIT (OUTPATIENT)
Dept: PODIATRY | Facility: CLINIC | Age: 87
End: 2022-11-02
Payer: MEDICARE

## 2022-11-02 VITALS
DIASTOLIC BLOOD PRESSURE: 46 MMHG | HEIGHT: 62 IN | WEIGHT: 179 LBS | BODY MASS INDEX: 32.94 KG/M2 | HEART RATE: 81 BPM | SYSTOLIC BLOOD PRESSURE: 117 MMHG

## 2022-11-02 DIAGNOSIS — G60.9 IDIOPATHIC PERIPHERAL NEUROPATHY: Primary | ICD-10-CM

## 2022-11-02 DIAGNOSIS — B35.1 DERMATOPHYTOSIS OF NAIL: ICD-10-CM

## 2022-11-02 DIAGNOSIS — L84 CORN OR CALLUS: ICD-10-CM

## 2022-11-02 DIAGNOSIS — R60.0 EDEMA LEG: ICD-10-CM

## 2022-11-02 PROCEDURE — 11056 ROUTINE FOOT CARE: ICD-10-PCS | Mod: Q9,S$PBB,, | Performed by: PODIATRIST

## 2022-11-02 PROCEDURE — 11056 PARNG/CUTG B9 HYPRKR LES 2-4: CPT | Mod: Q9,S$PBB,, | Performed by: PODIATRIST

## 2022-11-02 PROCEDURE — 11721 DEBRIDE NAIL 6 OR MORE: CPT | Performed by: PODIATRIST

## 2022-11-02 PROCEDURE — 11721 ROUTINE FOOT CARE: ICD-10-PCS | Mod: 59,Q9,S$PBB, | Performed by: PODIATRIST

## 2022-11-02 PROCEDURE — 99214 OFFICE O/P EST MOD 30 MIN: CPT | Mod: PBBFAC,PN | Performed by: PODIATRIST

## 2022-11-02 PROCEDURE — 99999 PR PBB SHADOW E&M-EST. PATIENT-LVL IV: ICD-10-PCS | Mod: PBBFAC,,, | Performed by: PODIATRIST

## 2022-11-02 PROCEDURE — 99999 PR PBB SHADOW E&M-EST. PATIENT-LVL IV: CPT | Mod: PBBFAC,,, | Performed by: PODIATRIST

## 2022-11-02 PROCEDURE — 99499 NO LOS: ICD-10-PCS | Mod: S$PBB,,, | Performed by: PODIATRIST

## 2022-11-02 PROCEDURE — 99499 UNLISTED E&M SERVICE: CPT | Mod: S$PBB,,, | Performed by: PODIATRIST

## 2022-11-02 NOTE — PROGRESS NOTES
Routine Foot Care    Date/Time: 11/2/2022 11:45 AM  Performed by: Astrid Rodriguez DPM  Authorized by: Astrid Rodriguez DPM     Consent Done?:  Yes (Verbal)  Hyperkeratotic Skin Lesions?: Yes    Number of trimmed lesions:  2  Location(s):  Left 1st Toe and Right 1st Toe    Nail Care Type:  Debride  Location(s): All  (Left 1st Toe, Left 3rd Toe, Left 2nd Toe, Left 4th Toe, Left 5th Toe, Right 1st Toe, Right 2nd Toe, Right 3rd Toe, Right 4th Toe and Right 5th Toe)  Patient tolerance:  Patient tolerated the procedure well with no immediate complications     With patient's permission, the toenails mentioned above were reduced and debrided using a nail nipper, removing offending nail and debris.   Utilizing a #15 scalpel, I trimmed the corns and calluses at the above mentioned location.      The patient will continue to monitor the areas daily, inspect the feet, wear protective shoe gear when ambulatory, and moisturizer to maintain skin integrity.

## 2022-11-02 NOTE — PROGRESS NOTES
Chief Complaint   Patient presents with    Foot Problem     Idiopathic peripheral neuropathy          MEDICAL DECISION MAKING:       Problem List Items Addressed This Visit    None  Visit Diagnoses       Idiopathic peripheral neuropathy    -  Primary    Edema leg        Dermatophytosis of nail        Corn or callus                    I counseled the patient on the patient's conditions, their implications and medical management.   Shoe inspection.     Maintain proper foot hygiene.   Continue wearing proper shoe gear, daily foot inspections, never walking without protective shoe gear, never putting sharp instruments to feet.          HPI:   Franklin Perez is a 89 y.o. male who presents to clinic with concerns of fungal toenails.      PCP:  Yahir Russo Jr, MD   CC: Foot Problem (Idiopathic peripheral neuropathy )           Patient Active Problem List   Diagnosis    AK (actinic keratosis)    Pseudoexfoliation syndrome    Nuclear sclerotic cataract of right eye    Posterior dislocation of left lens           Current Outpatient Medications on File Prior to Visit   Medication Sig Dispense Refill    amoxicillin (AMOXIL) 500 MG capsule Take 500 mg by mouth 3 (three) times daily.      aspirin (ECOTRIN) 81 MG EC tablet Take 81 mg by mouth once daily.      BYSTOLIC 10 mg Tab Take 10 mg by mouth 2 (two) times a day.   0    ciclopirox (LOPROX) 0.77 % Crea Apply topically 2 (two) times daily. 90 g 2    clotrimazole-betamethasone 1-0.05% (LOTRISONE) cream Apply topically once daily. To toenails and feet. 45 g 3    dicyclomine (BENTYL) 20 mg tablet Take 20 mg by mouth 2 (two) times daily.   0    fish oil-omega-3 fatty acids 300-1,000 mg capsule Take 2 g by mouth once daily.      fluorouracil (EFUDEX) 5 % cream Use hs for 2 weeks 40 g 3    ginkgo biloba leaf extract 60 mg Tab Take by mouth.      hydrALAZINE (APRESOLINE) 50 MG tablet Take 50 mg by mouth.       irbesartan (AVAPRO) 150 MG tablet Take 150 mg by mouth once daily.       "LIDOcaine HCL 2% (XYLOCAINE) 2 % jelly Apply topically as needed. Apply topically once nightly to affected part of foot/feet. 30 mL 2    meclizine (ANTIVERT) 25 mg tablet       multivitamin-iron-folic acid Tab 1 capsule.      prednisolon/gatiflox/bromfenac (PREDNISOL ACE-GATIFLOX-BROMFEN) 1-0.5-0.075 % DrpS Apply 1 drop to eye 3 (three) times daily. in operative eye for 1 month after surgery 5 mL 3    sennosides 8.6 mg Cap 2 tablets.      spironolactone (ALDACTONE) 50 MG tablet Take 50 mg by mouth once daily.       TEKTURNA 150 mg Tab Take 150 mg by mouth 2 (two) times a day.       TEKTURNA 300 mg Tab       timolol maleate 0.5% (TIMOPTIC-XE) 0.5 % SolG Place 1 drop into both eyes nightly.  0    traMADol (ULTRAM) 50 mg tablet       urea 20 % Crea Apply 1 application topically once daily. To dry skin on the feet and thick toenails. 75 g 10    furosemide (LASIX) 20 MG tablet Take 20 mg by mouth.      tazarotene (TAZORAC) 0.05 % Crea cream Apply topically once daily. Use on scalp for 2 days before treatment with light( PDT) 30 g 3     Current Facility-Administered Medications on File Prior to Visit   Medication Dose Route Frequency Provider Last Rate Last Admin    sodium chloride 0.9% flush 10 mL  10 mL Intravenous PRN Kan Kramer MD        sodium chloride 0.9% flush 10 mL  10 mL Intravenous PRN Kan Kramer MD        sodium chloride 0.9% flush 10 mL  10 mL Intravenous PRN Kan Kramer MD                Review of patient's allergies indicates:   Allergen Reactions    Aspirin Other (See Comments)     Other reaction(s): Acid reflux                   ROS:    General ROS: negative for - chills, fatigue or fever  Cardiovascular ROS: no chest pain or dyspnea on exertion  Musculoskeletal ROS: negative for - joint pain or joint stiffness   Skin: Negative for rash, Positive for nail or hair changes.  no itching.       EXAM:   Vitals:    11/02/22 1145   BP: (!) 117/46   Pulse: 81   Weight: 81.2 kg (179 lb)   Height: 5' 2" " (1.575 m)        General:  alert, no distress, cooperative    Vasc:    Pedal pulses: DP 1/4 - B/L and PT 1/4 - B/L  Capillary Refill Time: Normal - B/L  Temperature: Normal - B/L  Hair Growth: decreased - B/L  Edema:  Present bilaterally      Neuro Motor:   [place] present bilaterally,   Reflexes normal bilaterally,   Tinels absent  Mulders absent  +paresthesias (Abnormal spontaneous sensations in feet)     Derm:   Hair:  Decreased   Nails: onychomycosis of the toenails and brittle  Interdigital Spaces: clean, dry and without evidence of break in skin integrity  Wounds: None  Skin thin and atrophic, dry on the soles.       Msk:    tenderness absent    masses absent  range of movement non-painful and equal  crepitation absent bilaterally, strength normal 5/5

## 2023-01-11 ENCOUNTER — OFFICE VISIT (OUTPATIENT)
Dept: PODIATRY | Facility: CLINIC | Age: 88
End: 2023-01-11
Payer: MEDICARE

## 2023-01-11 VITALS
HEART RATE: 86 BPM | DIASTOLIC BLOOD PRESSURE: 63 MMHG | SYSTOLIC BLOOD PRESSURE: 140 MMHG | WEIGHT: 179 LBS | BODY MASS INDEX: 32.94 KG/M2 | HEIGHT: 62 IN

## 2023-01-11 DIAGNOSIS — R60.0 EDEMA LEG: ICD-10-CM

## 2023-01-11 DIAGNOSIS — L60.0 ONYCHOMYCOSIS WITH INGROWN TOENAIL: ICD-10-CM

## 2023-01-11 DIAGNOSIS — G60.9 IDIOPATHIC PERIPHERAL NEUROPATHY: Primary | ICD-10-CM

## 2023-01-11 DIAGNOSIS — B35.1 ONYCHOMYCOSIS WITH INGROWN TOENAIL: ICD-10-CM

## 2023-01-11 PROCEDURE — 99999 PR PBB SHADOW E&M-EST. PATIENT-LVL IV: CPT | Mod: PBBFAC,,, | Performed by: PODIATRIST

## 2023-01-11 PROCEDURE — 99214 OFFICE O/P EST MOD 30 MIN: CPT | Mod: PBBFAC,PN | Performed by: PODIATRIST

## 2023-01-11 PROCEDURE — 99214 OFFICE O/P EST MOD 30 MIN: CPT | Mod: S$PBB,,, | Performed by: PODIATRIST

## 2023-01-11 PROCEDURE — 99214 PR OFFICE/OUTPT VISIT, EST, LEVL IV, 30-39 MIN: ICD-10-PCS | Mod: S$PBB,,, | Performed by: PODIATRIST

## 2023-01-11 PROCEDURE — 99999 PR PBB SHADOW E&M-EST. PATIENT-LVL IV: ICD-10-PCS | Mod: PBBFAC,,, | Performed by: PODIATRIST

## 2023-01-11 NOTE — PROGRESS NOTES
Chief Complaint   Patient presents with    Foot Problem     Dr Yahir Russo 9-         MEDICAL DECISION MAKING:       Problem List Items Addressed This Visit    None  Visit Diagnoses       Idiopathic peripheral neuropathy    -  Primary    Edema leg        Onychomycosis with ingrown toenail                      I counseled the patient on the patient's conditions, their implications and medical management.   Shoe inspection.     Maintain proper foot hygiene.   Continue wearing proper shoe gear, daily foot inspections, never walking without protective shoe gear, never putting sharp instruments to feet.  Continue topical medications to nails.  Use lotion on feet daily.  Wide extra depth shoes.     I spent a total of 32 minutes on the day of the visit.  This includes face to face time and non-face to face time preparing to see the patient (eg, review of tests), obtaining and/or reviewing separately obtained history, documenting clinical information in the electronic or other health record, independently interpreting results and communicating results to the patient/family/caregiver, or care coordinator.            HPI:   Franklin Perez is a 89 y.o. male who presents to clinic with concerns of fungal toenails.      PCP:  Yahir Russo Jr, MD   CC: Foot Problem (Dr Yahir Russo 9-)             Patient Active Problem List   Diagnosis    AK (actinic keratosis)    Pseudoexfoliation syndrome    Nuclear sclerotic cataract of right eye    Posterior dislocation of left lens           Current Outpatient Medications on File Prior to Visit   Medication Sig Dispense Refill    amoxicillin (AMOXIL) 500 MG capsule Take 500 mg by mouth 3 (three) times daily.      aspirin (ECOTRIN) 81 MG EC tablet Take 81 mg by mouth once daily.      BYSTOLIC 10 mg Tab Take 10 mg by mouth 2 (two) times a day.   0    ciclopirox (LOPROX) 0.77 % Crea Apply topically 2 (two) times daily. 90 g 2    clotrimazole-betamethasone 1-0.05% (LOTRISONE)  cream Apply topically once daily. To toenails and feet. 45 g 3    dicyclomine (BENTYL) 20 mg tablet Take 20 mg by mouth 2 (two) times daily.   0    fish oil-omega-3 fatty acids 300-1,000 mg capsule Take 2 g by mouth once daily.      fluorouracil (EFUDEX) 5 % cream Use hs for 2 weeks 40 g 3    ginkgo biloba leaf extract 60 mg Tab Take by mouth.      hydrALAZINE (APRESOLINE) 50 MG tablet Take 50 mg by mouth.       irbesartan (AVAPRO) 150 MG tablet Take 150 mg by mouth once daily.      LIDOcaine HCL 2% (XYLOCAINE) 2 % jelly Apply topically as needed. Apply topically once nightly to affected part of foot/feet. 30 mL 2    meclizine (ANTIVERT) 25 mg tablet       multivitamin-iron-folic acid Tab 1 capsule.      prednisolon/gatiflox/bromfenac (PREDNISOL ACE-GATIFLOX-BROMFEN) 1-0.5-0.075 % DrpS Apply 1 drop to eye 3 (three) times daily. in operative eye for 1 month after surgery 5 mL 3    sennosides 8.6 mg Cap 2 tablets.      spironolactone (ALDACTONE) 50 MG tablet Take 50 mg by mouth once daily.       TEKTURNA 150 mg Tab Take 150 mg by mouth 2 (two) times a day.       TEKTURNA 300 mg Tab       timolol maleate 0.5% (TIMOPTIC-XE) 0.5 % SolG Place 1 drop into both eyes nightly.  0    traMADol (ULTRAM) 50 mg tablet       urea 20 % Crea Apply 1 application topically once daily. To dry skin on the feet and thick toenails. 75 g 10    furosemide (LASIX) 20 MG tablet Take 20 mg by mouth.      tazarotene (TAZORAC) 0.05 % Crea cream Apply topically once daily. Use on scalp for 2 days before treatment with light( PDT) 30 g 3     Current Facility-Administered Medications on File Prior to Visit   Medication Dose Route Frequency Provider Last Rate Last Admin    sodium chloride 0.9% flush 10 mL  10 mL Intravenous PRN Kan Kramer MD        sodium chloride 0.9% flush 10 mL  10 mL Intravenous PRN Kan Kramer MD        sodium chloride 0.9% flush 10 mL  10 mL Intravenous PRN Kan Kramer MD                Review of patient's allergies  "indicates:   Allergen Reactions    Aspirin Other (See Comments)     Other reaction(s): Acid reflux                   ROS:    General ROS: negative for - chills, fatigue or fever  Cardiovascular ROS: no chest pain or dyspnea on exertion  Musculoskeletal ROS: negative for - joint pain or joint stiffness   Skin: Negative for rash, Positive for nail or hair changes.  no itching.       EXAM:   Vitals:    01/11/23 1145   BP: (!) 140/63   Pulse: 86   Weight: 81.2 kg (179 lb)   Height: 5' 2" (1.575 m)        General:  alert, no distress, cooperative    Vasc:    Pedal pulses: DP 1/4 - B/L and PT 1/4 - B/L  Capillary Refill Time: Normal - B/L  Temperature: Normal - B/L  Hair Growth: decreased - B/L  Edema:  Present bilaterally      Neuro Motor:   [place] present bilaterally,   Reflexes normal bilaterally,   Tinels absent  Mulders absent  +paresthesias (Abnormal spontaneous sensations in feet)     Derm:   Hair:  Decreased   Nails: onychomycosis of the toenails and brittle  Interdigital Spaces: clean, dry and without evidence of break in skin integrity  Wounds: None  Skin thin and atrophic, dry on the soles.       Msk:    tenderness absent    masses absent  range of movement non-painful and equal  crepitation absent bilaterally, strength normal 5/5  "

## 2023-03-22 ENCOUNTER — OFFICE VISIT (OUTPATIENT)
Dept: PODIATRY | Facility: CLINIC | Age: 88
End: 2023-03-22
Payer: MEDICARE

## 2023-03-22 VITALS
BODY MASS INDEX: 32.94 KG/M2 | SYSTOLIC BLOOD PRESSURE: 169 MMHG | DIASTOLIC BLOOD PRESSURE: 76 MMHG | WEIGHT: 179 LBS | HEIGHT: 62 IN | HEART RATE: 96 BPM

## 2023-03-22 DIAGNOSIS — R60.0 EDEMA LEG: ICD-10-CM

## 2023-03-22 DIAGNOSIS — L60.0 ONYCHOMYCOSIS WITH INGROWN TOENAIL: ICD-10-CM

## 2023-03-22 DIAGNOSIS — B35.1 ONYCHOMYCOSIS WITH INGROWN TOENAIL: ICD-10-CM

## 2023-03-22 DIAGNOSIS — G60.9 IDIOPATHIC PERIPHERAL NEUROPATHY: Primary | ICD-10-CM

## 2023-03-22 PROCEDURE — 11721 ROUTINE FOOT CARE: ICD-10-PCS | Mod: Q9,S$PBB,, | Performed by: PODIATRIST

## 2023-03-22 PROCEDURE — 99499 UNLISTED E&M SERVICE: CPT | Mod: S$PBB,,, | Performed by: PODIATRIST

## 2023-03-22 PROCEDURE — 99214 OFFICE O/P EST MOD 30 MIN: CPT | Mod: PBBFAC,25,PN | Performed by: PODIATRIST

## 2023-03-22 PROCEDURE — 99499 NO LOS: ICD-10-PCS | Mod: S$PBB,,, | Performed by: PODIATRIST

## 2023-03-22 PROCEDURE — 99999 PR PBB SHADOW E&M-EST. PATIENT-LVL IV: ICD-10-PCS | Mod: PBBFAC,,, | Performed by: PODIATRIST

## 2023-03-22 PROCEDURE — 99999 PR PBB SHADOW E&M-EST. PATIENT-LVL IV: CPT | Mod: PBBFAC,,, | Performed by: PODIATRIST

## 2023-03-22 PROCEDURE — 11721 DEBRIDE NAIL 6 OR MORE: CPT | Mod: Q9,PBBFAC,PN | Performed by: PODIATRIST

## 2023-03-22 NOTE — PROGRESS NOTES
Chief Complaint   Patient presents with    Routine Foot Care     Foot Exam/PCP Yahir Russo Jr., MD 09/22/22         MEDICAL DECISION MAKING:       Problem List Items Addressed This Visit    None  Visit Diagnoses       Idiopathic peripheral neuropathy    -  Primary    Edema leg        Onychomycosis with ingrown toenail                        I counseled the patient on the patient's conditions, their implications and medical management.   Shoe inspection.     Maintain proper foot hygiene.   Continue wearing proper shoe gear, daily foot inspections, never walking without protective shoe gear, never putting sharp instruments to feet.  Continue topical medications to nails.  Use lotion on feet daily.  Wide extra depth shoes.         Routine Foot Care    Date/Time: 3/22/2023 11:00 AM  Performed by: Astrid Rodriguez DPM  Authorized by: Astrid Rodriguez DPM     Consent Done?:  Yes (Verbal)    Nail Care Type:  Debride  Location(s): All  (Left 1st Toe, Left 3rd Toe, Left 2nd Toe, Left 4th Toe, Left 5th Toe, Right 1st Toe, Right 2nd Toe, Right 3rd Toe, Right 4th Toe and Right 5th Toe)  Patient tolerance:  Patient tolerated the procedure well with no immediate complications     With patient's permission, the toenails mentioned above were reduced and debrided using a nail nipper, removing offending nail and debris. The patient will continue to monitor the areas daily, inspect the feet, wear protective shoe gear when ambulatory, and moisturizer to maintain skin integrity.             HPI:   Franklin Perez is a 89 y.o. male who presents to clinic with concerns of fungal toenails.      PCP:  Yahir Russo Jr, MD   CC: Routine Foot Care (Foot Exam/PCP Yahir Russo Jr., MD 09/22/22)             Patient Active Problem List   Diagnosis    AK (actinic keratosis)    Pseudoexfoliation syndrome    Nuclear sclerotic cataract of right eye    Posterior dislocation of left lens           Current Outpatient Medications on File Prior to Visit    Medication Sig Dispense Refill    amoxicillin (AMOXIL) 500 MG capsule Take 500 mg by mouth 3 (three) times daily.      aspirin (ECOTRIN) 81 MG EC tablet Take 81 mg by mouth once daily.      BYSTOLIC 10 mg Tab Take 10 mg by mouth 2 (two) times a day.   0    ciclopirox (LOPROX) 0.77 % Crea Apply topically 2 (two) times daily. 90 g 2    clotrimazole-betamethasone 1-0.05% (LOTRISONE) cream Apply topically once daily. To toenails and feet. 45 g 3    dicyclomine (BENTYL) 20 mg tablet Take 20 mg by mouth 2 (two) times daily.   0    fish oil-omega-3 fatty acids 300-1,000 mg capsule Take 2 g by mouth once daily.      fluorouracil (EFUDEX) 5 % cream Use hs for 2 weeks 40 g 3    ginkgo biloba leaf extract 60 mg Tab Take by mouth.      hydrALAZINE (APRESOLINE) 50 MG tablet Take 50 mg by mouth.       irbesartan (AVAPRO) 150 MG tablet Take 150 mg by mouth once daily.      LIDOcaine HCL 2% (XYLOCAINE) 2 % jelly Apply topically as needed. Apply topically once nightly to affected part of foot/feet. 30 mL 2    meclizine (ANTIVERT) 25 mg tablet       multivitamin-iron-folic acid Tab 1 capsule.      prednisolon/gatiflox/bromfenac (PREDNISOL ACE-GATIFLOX-BROMFEN) 1-0.5-0.075 % DrpS Apply 1 drop to eye 3 (three) times daily. in operative eye for 1 month after surgery 5 mL 3    sennosides 8.6 mg Cap 2 tablets.      spironolactone (ALDACTONE) 50 MG tablet Take 50 mg by mouth once daily.       TEKTURNA 150 mg Tab Take 150 mg by mouth 2 (two) times a day.       TEKTURNA 300 mg Tab       timolol maleate 0.5% (TIMOPTIC-XE) 0.5 % SolG Place 1 drop into both eyes nightly.  0    traMADol (ULTRAM) 50 mg tablet       urea 20 % Crea Apply 1 application topically once daily. To dry skin on the feet and thick toenails. 75 g 10    furosemide (LASIX) 20 MG tablet Take 20 mg by mouth.      tazarotene (TAZORAC) 0.05 % Crea cream Apply topically once daily. Use on scalp for 2 days before treatment with light( PDT) 30 g 3     Current Facility-Administered  "Medications on File Prior to Visit   Medication Dose Route Frequency Provider Last Rate Last Admin    sodium chloride 0.9% flush 10 mL  10 mL Intravenous PRN Kan Kramer MD        sodium chloride 0.9% flush 10 mL  10 mL Intravenous PRN Kan Kramer MD        sodium chloride 0.9% flush 10 mL  10 mL Intravenous PRN Kan Kramer MD                Review of patient's allergies indicates:   Allergen Reactions    Aspirin Other (See Comments)     Other reaction(s): Acid reflux                   ROS:    General ROS: negative for - chills, fatigue or fever  Cardiovascular ROS: no chest pain or dyspnea on exertion  Musculoskeletal ROS: negative for - joint pain or joint stiffness   Skin: Negative for rash, Positive for nail or hair changes.  no itching.       EXAM:   Vitals:    03/22/23 1059   BP: (!) 169/76   Pulse: 96   Weight: 81.2 kg (179 lb)   Height: 5' 2" (1.575 m)        General:  alert, no distress, cooperative    Vasc:    Pedal pulses: DP 1/4 - B/L and PT 1/4 - B/L  Capillary Refill Time: Normal - B/L  Temperature: Normal - B/L  Hair Growth: decreased - B/L  Edema:  Present bilaterally      Neuro Motor:   [place] present bilaterally,   Reflexes normal bilaterally,   Tinels absent  Mulders absent  +paresthesias (Abnormal spontaneous sensations in feet)     Derm:   Hair:  Decreased   Nails: onychomycosis of the toenails and brittle, incurvated toenail medial border of the right hallux.  No drainage.   Interdigital Spaces: clean, dry and without evidence of break in skin integrity  Wounds: None  Skin thin and atrophic, dry on the soles.       Msk:    tenderness absent    masses absent  range of movement non-painful and equal  crepitation absent bilaterally, strength normal 5/5  "

## 2023-05-23 ENCOUNTER — OFFICE VISIT (OUTPATIENT)
Dept: PODIATRY | Facility: CLINIC | Age: 88
End: 2023-05-23
Payer: MEDICARE

## 2023-05-23 VITALS
HEART RATE: 86 BPM | SYSTOLIC BLOOD PRESSURE: 139 MMHG | DIASTOLIC BLOOD PRESSURE: 69 MMHG | BODY MASS INDEX: 32.94 KG/M2 | HEIGHT: 62 IN | WEIGHT: 179 LBS

## 2023-05-23 DIAGNOSIS — G60.9 IDIOPATHIC PERIPHERAL NEUROPATHY: Primary | ICD-10-CM

## 2023-05-23 DIAGNOSIS — R60.0 EDEMA LEG: ICD-10-CM

## 2023-05-23 DIAGNOSIS — B35.1 DERMATOPHYTOSIS OF NAIL: ICD-10-CM

## 2023-05-23 PROCEDURE — 99999 PR PBB SHADOW E&M-EST. PATIENT-LVL IV: CPT | Mod: PBBFAC,,, | Performed by: PODIATRIST

## 2023-05-23 PROCEDURE — 99214 OFFICE O/P EST MOD 30 MIN: CPT | Mod: PBBFAC,PN | Performed by: PODIATRIST

## 2023-05-23 PROCEDURE — 99213 OFFICE O/P EST LOW 20 MIN: CPT | Mod: S$PBB,,, | Performed by: PODIATRIST

## 2023-05-23 PROCEDURE — 99999 PR PBB SHADOW E&M-EST. PATIENT-LVL IV: ICD-10-PCS | Mod: PBBFAC,,, | Performed by: PODIATRIST

## 2023-05-23 PROCEDURE — 99213 PR OFFICE/OUTPT VISIT, EST, LEVL III, 20-29 MIN: ICD-10-PCS | Mod: S$PBB,,, | Performed by: PODIATRIST

## 2023-05-23 NOTE — PROGRESS NOTES
Chief Complaint   Patient presents with    Routine Foot Care     Foot Exam/PCP Yahir Russo Jr., MD          MEDICAL DECISION MAKING:       Problem List Items Addressed This Visit    None  Visit Diagnoses       Idiopathic peripheral neuropathy    -  Primary    Edema leg        Dermatophytosis of nail                      I counseled the patient on the patient's conditions, their implications and medical management.   Shoe inspection.     Maintain proper foot hygiene.   Continue wearing proper shoe gear, daily foot inspections, never walking without protective shoe gear, never putting sharp instruments to feet.  Continue topical medications to nails.  Use lotion on feet daily.  Wide extra depth shoes.               HPI:   Franklin Perez is a 89 y.o. male who presents to clinic with concerns of fungal toenails.      PCP:  Yahir Russo Jr, MD   CC: Routine Foot Care (Foot Exam/PCP Yahir Russo Jr., MD )             Patient Active Problem List   Diagnosis    AK (actinic keratosis)    Pseudoexfoliation syndrome    Nuclear sclerotic cataract of right eye    Posterior dislocation of left lens           Current Outpatient Medications on File Prior to Visit   Medication Sig Dispense Refill    amoxicillin (AMOXIL) 500 MG capsule Take 500 mg by mouth 3 (three) times daily.      aspirin (ECOTRIN) 81 MG EC tablet Take 81 mg by mouth once daily.      BYSTOLIC 10 mg Tab Take 10 mg by mouth 2 (two) times a day.   0    ciclopirox (LOPROX) 0.77 % Crea Apply topically 2 (two) times daily. 90 g 2    clotrimazole-betamethasone 1-0.05% (LOTRISONE) cream Apply topically once daily. To toenails and feet. 45 g 3    dicyclomine (BENTYL) 20 mg tablet Take 20 mg by mouth 2 (two) times daily.   0    fish oil-omega-3 fatty acids 300-1,000 mg capsule Take 2 g by mouth once daily.      fluorouracil (EFUDEX) 5 % cream Use hs for 2 weeks 40 g 3    ginkgo biloba leaf extract 60 mg Tab Take by mouth.      hydrALAZINE (APRESOLINE) 50 MG tablet  Take 50 mg by mouth.       irbesartan (AVAPRO) 150 MG tablet Take 150 mg by mouth once daily.      LIDOcaine HCL 2% (XYLOCAINE) 2 % jelly Apply topically as needed. Apply topically once nightly to affected part of foot/feet. 30 mL 2    meclizine (ANTIVERT) 25 mg tablet       multivitamin-iron-folic acid Tab 1 capsule.      prednisolon/gatiflox/bromfenac (PREDNISOL ACE-GATIFLOX-BROMFEN) 1-0.5-0.075 % DrpS Apply 1 drop to eye 3 (three) times daily. in operative eye for 1 month after surgery 5 mL 3    sennosides 8.6 mg Cap 2 tablets.      spironolactone (ALDACTONE) 50 MG tablet Take 50 mg by mouth once daily.       TEKTURNA 150 mg Tab Take 150 mg by mouth 2 (two) times a day.       TEKTURNA 300 mg Tab       timolol maleate 0.5% (TIMOPTIC-XE) 0.5 % SolG Place 1 drop into both eyes nightly.  0    traMADol (ULTRAM) 50 mg tablet       urea 20 % Crea Apply 1 application topically once daily. To dry skin on the feet and thick toenails. 75 g 10    furosemide (LASIX) 20 MG tablet Take 20 mg by mouth.      tazarotene (TAZORAC) 0.05 % Crea cream Apply topically once daily. Use on scalp for 2 days before treatment with light( PDT) 30 g 3     Current Facility-Administered Medications on File Prior to Visit   Medication Dose Route Frequency Provider Last Rate Last Admin    sodium chloride 0.9% flush 10 mL  10 mL Intravenous PRN Kan Kramer MD        sodium chloride 0.9% flush 10 mL  10 mL Intravenous PRN Kan Kramer MD        sodium chloride 0.9% flush 10 mL  10 mL Intravenous PRN Kan Kramer MD                Review of patient's allergies indicates:   Allergen Reactions    Aspirin Other (See Comments)     Other reaction(s): Acid reflux                   ROS:    General ROS: negative for - chills, fatigue or fever  Cardiovascular ROS: no chest pain or dyspnea on exertion  Musculoskeletal ROS: negative for - joint pain or joint stiffness   Skin: Negative for rash, Positive for nail or hair changes.  no itching.       EXAM:  "  Vitals:    05/23/23 1136   BP: 139/69   Pulse: 86   Weight: 81.2 kg (179 lb)   Height: 5' 2" (1.575 m)        General:  alert, no distress, cooperative    Vasc:    Pedal pulses: DP 1/4 - B/L and PT 1/4 - B/L  Capillary Refill Time: Normal - B/L  Temperature: Normal - B/L  Hair Growth: decreased - B/L  Edema:  Present bilaterally      Neuro Motor:   [place] present bilaterally,   Reflexes normal bilaterally,   Tinels absent  Mulders absent  +paresthesias (Abnormal spontaneous sensations in feet)     Derm:   Hair:  Decreased   Nails: onychomycosis of the toenails and brittle, incurvated toenail medial border of the right hallux.  No drainage.   Interdigital Spaces: clean, dry and without evidence of break in skin integrity  Wounds: None  Skin thin and atrophic, dry on the soles.       Msk:    tenderness absent    masses absent  range of movement non-painful and equal  crepitation absent bilaterally, strength normal 5/5  "

## 2023-08-04 ENCOUNTER — OFFICE VISIT (OUTPATIENT)
Dept: PODIATRY | Facility: CLINIC | Age: 88
End: 2023-08-04
Payer: MEDICARE

## 2023-08-04 VITALS
HEART RATE: 86 BPM | BODY MASS INDEX: 32.94 KG/M2 | HEIGHT: 62 IN | SYSTOLIC BLOOD PRESSURE: 144 MMHG | DIASTOLIC BLOOD PRESSURE: 58 MMHG | WEIGHT: 179 LBS

## 2023-08-04 DIAGNOSIS — L60.0 INGROWN NAIL: ICD-10-CM

## 2023-08-04 DIAGNOSIS — M79.674 TOE PAIN, RIGHT: ICD-10-CM

## 2023-08-04 DIAGNOSIS — L03.031 PARONYCHIA, TOE, RIGHT: Primary | ICD-10-CM

## 2023-08-04 PROCEDURE — 99214 PR OFFICE/OUTPT VISIT, EST, LEVL IV, 30-39 MIN: ICD-10-PCS | Mod: S$PBB,,, | Performed by: PODIATRIST

## 2023-08-04 PROCEDURE — 99214 OFFICE O/P EST MOD 30 MIN: CPT | Mod: PBBFAC,PN | Performed by: PODIATRIST

## 2023-08-04 PROCEDURE — 99999 PR PBB SHADOW E&M-EST. PATIENT-LVL IV: ICD-10-PCS | Mod: PBBFAC,,, | Performed by: PODIATRIST

## 2023-08-04 PROCEDURE — 99999 PR PBB SHADOW E&M-EST. PATIENT-LVL IV: CPT | Mod: PBBFAC,,, | Performed by: PODIATRIST

## 2023-08-04 PROCEDURE — 99214 OFFICE O/P EST MOD 30 MIN: CPT | Mod: S$PBB,,, | Performed by: PODIATRIST

## 2023-08-04 RX ORDER — TOBRAMYCIN 3 MG/ML
SOLUTION/ DROPS OPHTHALMIC
Qty: 5 ML | Refills: 3 | Status: SHIPPED | OUTPATIENT
Start: 2023-08-04

## 2023-08-04 NOTE — PROGRESS NOTES
Subjective:      Patient ID: Franklin Perez is a 89 y.o. male.    Chief Complaint: Nail Care    Sharp, throbbing pain in the right big toe/toenail.  Gradual onset, worsening over the past day or so.  Aggravated by socks shoes pressure ambulation.  No prior medical treatment.  No self-treatment.  Patient denies trauma and surgery right toe.    Review of Systems   Constitutional: Negative for chills, decreased appetite, diaphoresis, fever, malaise/fatigue and night sweats.   Skin:  Positive for nail changes.           Objective:      Physical Exam  Constitutional:       General: He is not in acute distress.     Appearance: He is well-developed. He is not diaphoretic.   Cardiovascular:      Pulses:           Popliteal pulses are 2+ on the right side and 2+ on the left side.        Dorsalis pedis pulses are 2+ on the right side and 2+ on the left side.        Posterior tibial pulses are 2+ on the right side and 2+ on the left side.      Comments: Capillary refill 3 seconds all toes/distal feet, all toes/both feet warm to touch.      Negative lymphadenopathy bilateral popliteal fossa and tarsal tunnel.      [place] lower extremity edema bilateral.    Musculoskeletal:      Right ankle: No swelling, deformity, ecchymosis or lacerations. Normal range of motion. Normal pulse.      Right Achilles Tendon: Normal. No defects. Narvaez's test negative.   Lymphadenopathy:      Lower Body: No right inguinal adenopathy. No left inguinal adenopathy.      Comments: Negative lymphadenopathy bilateral popliteal fossa and tarsal tunnel.    Negative lymphangitic streaking bilateral feet/ankles/legs.   Skin:     General: Skin is warm and dry.      Capillary Refill: Capillary refill takes 2 to 3 seconds.      Coloration: Skin is not pale.      Findings: No abrasion, bruising, burn, ecchymosis, erythema, laceration, lesion or rash.      Nails: There is no clubbing.      Comments: Visible and palpable ingrowth of toenail medial border right  hallux with pain to palpation, and focal localized erythema and edema,  without ulceration, drainage, pus, tracking, fluctuance, malodor, or cardinal signs infection.       Neurological:      Mental Status: He is alert and oriented to person, place, and time.      Sensory: No sensory deficit.      Motor: No tremor, atrophy or abnormal muscle tone.      Gait: Gait normal.      Deep Tendon Reflexes:      Reflex Scores:       Patellar reflexes are 2+ on the right side and 2+ on the left side.       Achilles reflexes are 2+ on the right side and 2+ on the left side.  Psychiatric:         Behavior: Behavior is cooperative.               Assessment:       Encounter Diagnoses   Name Primary?    Paronychia, toe, right Yes    Toe pain, right     Ingrown nail          Plan:       Franklin was seen today for nail care.    Diagnoses and all orders for this visit:    Paronychia, toe, right    Toe pain, right    Ingrown nail    Other orders  -     tobramycin sulfate 0.3% (TOBREX) 0.3 % ophthalmic solution; 1-2 drops topically twice daily to affected toe(s).      I counseled the patient on his conditions, their implications and medical management.    Cover right hallux all times with Band-Aid or similar changing daily.      Topical tobramycin drops twice daily right hallux.      Discussed conservative treatment with shoes of adequate dimensions, material, and style to alleviate symptoms and delay or prevent surgical intervention.      Utilizing sterile toenail clippers I aggressively debrided the offending nail border approximately 3 mm from its edge and carried the nail plate incision down at an angle in order to wedge out the offending cryptotic portion of the nail plate. The offending border was then removed in toto. The area was cleansed with alcohol. Patient tolerated the procedure well and related significant relief.          Follow up if symptoms worsen or fail to improve.

## 2023-08-21 ENCOUNTER — OFFICE VISIT (OUTPATIENT)
Dept: PODIATRY | Facility: CLINIC | Age: 88
End: 2023-08-21
Payer: MEDICARE

## 2023-08-21 VITALS
WEIGHT: 179 LBS | HEIGHT: 62 IN | SYSTOLIC BLOOD PRESSURE: 128 MMHG | HEART RATE: 77 BPM | BODY MASS INDEX: 32.94 KG/M2 | DIASTOLIC BLOOD PRESSURE: 59 MMHG

## 2023-08-21 DIAGNOSIS — L60.0 ONYCHOMYCOSIS WITH INGROWN TOENAIL: Primary | ICD-10-CM

## 2023-08-21 DIAGNOSIS — B35.1 ONYCHOMYCOSIS WITH INGROWN TOENAIL: Primary | ICD-10-CM

## 2023-08-21 PROCEDURE — 17999 PR NON-COVERED FOOT CARE: ICD-10-PCS | Mod: CSM,S$GLB,, | Performed by: PODIATRIST

## 2023-08-21 PROCEDURE — 99499 NO LOS: ICD-10-PCS | Mod: ,,, | Performed by: PODIATRIST

## 2023-08-21 PROCEDURE — 99499 UNLISTED E&M SERVICE: CPT | Mod: ,,, | Performed by: PODIATRIST

## 2023-08-21 PROCEDURE — 99999 PR PBB SHADOW E&M-EST. PATIENT-LVL III: CPT | Mod: PBBFAC,,, | Performed by: PODIATRIST

## 2023-08-21 PROCEDURE — 99999 PR PBB SHADOW E&M-EST. PATIENT-LVL III: ICD-10-PCS | Mod: PBBFAC,,, | Performed by: PODIATRIST

## 2023-08-21 PROCEDURE — 99213 OFFICE O/P EST LOW 20 MIN: CPT | Mod: PBBFAC,PN | Performed by: PODIATRIST

## 2023-08-21 PROCEDURE — 17999 UNLISTD PX SKN MUC MEMB SUBQ: CPT | Mod: CSM,S$GLB,, | Performed by: PODIATRIST

## 2023-08-21 NOTE — PROGRESS NOTES
"Vitals:    08/21/23 1513   BP: (!) 128/59   Pulse: 77   Weight: 81.2 kg (179 lb)   Height: 5' 2" (1.575 m)       Onychomycosis with ingrown toenail        Patient presents to the clinic for non-covered routine foot care. Patient is not a high risk foot care patient. Patient understands this is not typically a covered service and patient is aware of responsibility of payment. Pedal pulses are palpable. No known risk factors requiring routine foot care.  Nails were reduced and trimmed bilaterally. Patient tolerated well.   "

## 2023-12-06 ENCOUNTER — OFFICE VISIT (OUTPATIENT)
Dept: PODIATRY | Facility: CLINIC | Age: 88
End: 2023-12-06
Payer: MEDICARE

## 2023-12-06 VITALS
HEIGHT: 62 IN | WEIGHT: 179 LBS | DIASTOLIC BLOOD PRESSURE: 84 MMHG | BODY MASS INDEX: 32.94 KG/M2 | HEART RATE: 84 BPM | SYSTOLIC BLOOD PRESSURE: 129 MMHG

## 2023-12-06 DIAGNOSIS — G60.9 IDIOPATHIC PERIPHERAL NEUROPATHY: ICD-10-CM

## 2023-12-06 DIAGNOSIS — L60.0 ONYCHOMYCOSIS WITH INGROWN TOENAIL: ICD-10-CM

## 2023-12-06 DIAGNOSIS — B35.1 ONYCHOMYCOSIS WITH INGROWN TOENAIL: ICD-10-CM

## 2023-12-06 DIAGNOSIS — R21 RASH OF FOOT: Primary | ICD-10-CM

## 2023-12-06 PROCEDURE — 99999 PR PBB SHADOW E&M-EST. PATIENT-LVL V: CPT | Mod: PBBFAC,,, | Performed by: PODIATRIST

## 2023-12-06 PROCEDURE — 99215 OFFICE O/P EST HI 40 MIN: CPT | Mod: PBBFAC,PN | Performed by: PODIATRIST

## 2023-12-06 PROCEDURE — 99213 PR OFFICE/OUTPT VISIT, EST, LEVL III, 20-29 MIN: ICD-10-PCS | Mod: S$PBB,,, | Performed by: PODIATRIST

## 2023-12-06 PROCEDURE — 99999 PR PBB SHADOW E&M-EST. PATIENT-LVL V: ICD-10-PCS | Mod: PBBFAC,,, | Performed by: PODIATRIST

## 2023-12-06 PROCEDURE — 99213 OFFICE O/P EST LOW 20 MIN: CPT | Mod: S$PBB,,, | Performed by: PODIATRIST

## 2023-12-06 RX ORDER — CLOTRIMAZOLE AND BETAMETHASONE DIPROPIONATE 10; .64 MG/G; MG/G
CREAM TOPICAL DAILY
Qty: 45 G | Refills: 1 | Status: SHIPPED | OUTPATIENT
Start: 2023-12-06

## 2023-12-06 NOTE — PROGRESS NOTES
Chief Complaint   Patient presents with    Routine Foot Care     Foot Exam/PCP Yahir Russo Jr., MD         MEDICAL DECISION MAKING:       Problem List Items Addressed This Visit    None  Visit Diagnoses       Rash of foot    -  Primary    Relevant Medications    clotrimazole-betamethasone 1-0.05% (LOTRISONE) cream    Idiopathic peripheral neuropathy        Relevant Orders    Routine Foot Care    Onychomycosis with ingrown toenail        Relevant Orders    Routine Foot Care              I counseled the patient on the patient's conditions, their implications and medical management.   Shoe inspection.     Maintain proper foot hygiene.   Continue wearing proper shoe gear, daily foot inspections, never walking without protective shoe gear, never putting sharp instruments to feet.  Continue topical medications to nails.  Use lotion on feet daily.  Wide extra depth shoes.   Rash dorsal left foot, no trauma.   No itching.    Prescription topical as ordered.  Follow up 3 months foot exam or sooner if concerned.       I spent a total of 25 minutes on the day of the visit.  This includes face to face time and non-face to face time preparing to see the patient (eg, review of tests), obtaining and/or reviewing separately obtained history, documenting clinical information in the electronic or other health record, independently interpreting results and communicating results to the patient/family/caregiver, or care coordinator.            HPI:   Franklin Perez is a 89 y.o. male who presents to clinic with concerns of fungal toenails, and rash dorsal left foot x one month.  No trauma. No itching.          PCP:  Yahir Russo Jr., MD   CC: Routine Foot Care (Foot Exam/PCP Yahir Russo Jr., MD)             Patient Active Problem List   Diagnosis    AK (actinic keratosis)    Pseudoexfoliation syndrome    Nuclear sclerotic cataract of right eye    Posterior dislocation of left lens           Current Outpatient Medications on  File Prior to Visit   Medication Sig Dispense Refill    amoxicillin (AMOXIL) 500 MG capsule Take 500 mg by mouth 3 (three) times daily.      aspirin (ECOTRIN) 81 MG EC tablet Take 81 mg by mouth once daily.      BYSTOLIC 10 mg Tab Take 10 mg by mouth 2 (two) times a day.   0    ciclopirox (LOPROX) 0.77 % Crea Apply topically 2 (two) times daily. 90 g 2    dicyclomine (BENTYL) 20 mg tablet Take 20 mg by mouth 2 (two) times daily.   0    fish oil-omega-3 fatty acids 300-1,000 mg capsule Take 2 g by mouth once daily.      fluorouracil (EFUDEX) 5 % cream Use hs for 2 weeks 40 g 3    ginkgo biloba leaf extract 60 mg Tab Take by mouth.      hydrALAZINE (APRESOLINE) 50 MG tablet Take 50 mg by mouth.       irbesartan (AVAPRO) 150 MG tablet Take 150 mg by mouth once daily.      LIDOcaine HCL 2% (XYLOCAINE) 2 % jelly Apply topically as needed. Apply topically once nightly to affected part of foot/feet. 30 mL 2    meclizine (ANTIVERT) 25 mg tablet       multivitamin-iron-folic acid Tab 1 capsule.      prednisolon/gatiflox/bromfenac (PREDNISOL ACE-GATIFLOX-BROMFEN) 1-0.5-0.075 % DrpS Apply 1 drop to eye 3 (three) times daily. in operative eye for 1 month after surgery 5 mL 3    sennosides 8.6 mg Cap 2 tablets.      spironolactone (ALDACTONE) 50 MG tablet Take 50 mg by mouth once daily.       TEKTURNA 150 mg Tab Take 150 mg by mouth 2 (two) times a day.       TEKTURNA 300 mg Tab       timolol maleate 0.5% (TIMOPTIC-XE) 0.5 % SolG Place 1 drop into both eyes nightly.  0    tobramycin sulfate 0.3% (TOBREX) 0.3 % ophthalmic solution 1-2 drops topically twice daily to affected toe(s). 5 mL 3    traMADol (ULTRAM) 50 mg tablet       urea 20 % Crea Apply 1 application topically once daily. To dry skin on the feet and thick toenails. 75 g 10    [DISCONTINUED] clotrimazole-betamethasone 1-0.05% (LOTRISONE) cream Apply topically once daily. To toenails and feet. 45 g 3    furosemide (LASIX) 20 MG tablet Take 20 mg by mouth.       "tazarotene (TAZORAC) 0.05 % Crea cream Apply topically once daily. Use on scalp for 2 days before treatment with light( PDT) 30 g 3     Current Facility-Administered Medications on File Prior to Visit   Medication Dose Route Frequency Provider Last Rate Last Admin    sodium chloride 0.9% flush 10 mL  10 mL Intravenous PRN Kan Kramer MD        sodium chloride 0.9% flush 10 mL  10 mL Intravenous PRN Kan Kramer MD        sodium chloride 0.9% flush 10 mL  10 mL Intravenous PRN Kan Kramer MD                Review of patient's allergies indicates:   Allergen Reactions    Aspirin Other (See Comments)     Other reaction(s): Acid reflux                   ROS:    General ROS: negative for - chills, fatigue or fever  Cardiovascular ROS: no chest pain or dyspnea on exertion  Musculoskeletal ROS: negative for - joint pain or joint stiffness   Skin: Negative for rash, Positive for nail or hair changes.  no itching.       EXAM:   Vitals:    12/06/23 1107   BP: 129/84   Pulse: 84   Weight: 81.2 kg (179 lb)   Height: 5' 2" (1.575 m)        General:  alert, no distress, cooperative    Vasc:    Pedal pulses: DP 1/4 - B/L and PT 1/4 - B/L  Capillary Refill Time: Normal - B/L  Temperature: Normal - B/L  Hair Growth: decreased - B/L  Edema:  Present bilaterally      Neuro Motor:   [place] present bilaterally,   Reflexes normal bilaterally,   Tinels absent  [place] absent  +paresthesias (Abnormal spontaneous sensations in feet)     Derm:   Hair:  Decreased   Nails: onychomycosis of the toenails and brittle, incurvated toenail medial border of the right hallux.  No drainage.   Interdigital Spaces: clean, dry and without evidence of break in skin integrity  Wounds: None  Skin thin and atrophic, dry on the soles.   Mild erythema dorsal left foot, no vesicles, no pruritus, no cellulitis.       Msk:    tenderness absent    masses absent  range of movement non-painful and equal  crepitation absent bilaterally, strength normal " 5/5

## 2024-03-06 ENCOUNTER — OFFICE VISIT (OUTPATIENT)
Dept: PODIATRY | Facility: CLINIC | Age: 89
End: 2024-03-06
Payer: MEDICARE

## 2024-03-06 VITALS
DIASTOLIC BLOOD PRESSURE: 67 MMHG | HEIGHT: 62 IN | WEIGHT: 179 LBS | SYSTOLIC BLOOD PRESSURE: 138 MMHG | HEART RATE: 82 BPM | BODY MASS INDEX: 32.94 KG/M2

## 2024-03-06 DIAGNOSIS — M79.674 TOE PAIN, RIGHT: ICD-10-CM

## 2024-03-06 DIAGNOSIS — R60.0 EDEMA LEG: ICD-10-CM

## 2024-03-06 DIAGNOSIS — G60.9 IDIOPATHIC PERIPHERAL NEUROPATHY: ICD-10-CM

## 2024-03-06 DIAGNOSIS — M79.675 TOE PAIN, LEFT: ICD-10-CM

## 2024-03-06 DIAGNOSIS — L60.0 ONYCHOMYCOSIS WITH INGROWN TOENAIL: Primary | ICD-10-CM

## 2024-03-06 DIAGNOSIS — B35.1 ONYCHOMYCOSIS WITH INGROWN TOENAIL: Primary | ICD-10-CM

## 2024-03-06 PROCEDURE — 11721 DEBRIDE NAIL 6 OR MORE: CPT | Mod: Q9,PBBFAC,PN | Performed by: PODIATRIST

## 2024-03-06 PROCEDURE — 99499 UNLISTED E&M SERVICE: CPT | Mod: S$PBB,,, | Performed by: PODIATRIST

## 2024-03-06 PROCEDURE — 99999 PR PBB SHADOW E&M-EST. PATIENT-LVL III: CPT | Mod: PBBFAC,,, | Performed by: PODIATRIST

## 2024-03-06 PROCEDURE — 99213 OFFICE O/P EST LOW 20 MIN: CPT | Mod: PBBFAC,PN,25 | Performed by: PODIATRIST

## 2024-03-06 NOTE — PROGRESS NOTES
"Chief Complaint   Patient presents with    Diabetic Foot Exam     Foot Exam/PCP Yahir Russo Jr., MD  02/27/24         MEDICAL DECISION MAKING:       Problem List Items Addressed This Visit    None  Visit Diagnoses       Onychomycosis with ingrown toenail    -  Primary    Relevant Orders    Routine Foot Care    Toe pain, right        Relevant Orders    Routine Foot Care    Toe pain, left        Relevant Orders    Routine Foot Care    Edema leg        Relevant Orders    Routine Foot Care    Idiopathic peripheral neuropathy        Relevant Orders    Routine Foot Care            I counseled the patient on the patient's conditions, their implications and medical management.   Shoe inspection.     Maintain proper foot hygiene.   Continue wearing proper shoe gear, daily foot inspections, never walking without protective shoe gear, never putting sharp instruments to feet.  Continue topical medications to nails.  Use lotion on feet daily.  Continue Wide extra depth shoes.   Follow up 3 months foot exam or sooner if concerned.       Routine Foot Care    Date/Time: 3/6/2024 11:00 AM    Performed by: Astrid Rodriguez DPM  Authorized by: Astrid Rodriguez DPM    Time out: Immediately prior to procedure a "time out" was called to verify the correct patient, procedure, equipment, support staff and site/side marked as required.      Nail Care Type:  Debride  Location(s): All  (Left 1st Toe, Left 3rd Toe, Left 2nd Toe, Left 4th Toe, Left 5th Toe, Right 1st Toe, Right 2nd Toe, Right 3rd Toe, Right 4th Toe and Right 5th Toe)  Patient tolerance:  Patient tolerated the procedure well with no immediate complications     With patient's permission, the toenails mentioned above were reduced and debrided using a nail nipper, removing offending nail and debris. The patient will continue to monitor the areas daily, inspect the feet, wear protective shoe gear when ambulatory, and moisturizer to maintain skin integrity.             HPI:   Franklin " Chris is a 89 y.o. male who presents to clinic for Diabetic Foot Exam (Foot Exam/PCP Yahir Russo Jr., MD  02/27/24)           PCP:  Yahir Russo Jr., MD   CC: Diabetic Foot Exam (Foot Exam/PCP Yahir Russo Jr., MD  02/27/24)             Patient Active Problem List   Diagnosis    AK (actinic keratosis)    Pseudoexfoliation syndrome    Nuclear sclerotic cataract of right eye    Posterior dislocation of left lens           Current Outpatient Medications on File Prior to Visit   Medication Sig Dispense Refill    amoxicillin (AMOXIL) 500 MG capsule Take 500 mg by mouth 3 (three) times daily.      aspirin (ECOTRIN) 81 MG EC tablet Take 81 mg by mouth once daily.      BYSTOLIC 10 mg Tab Take 10 mg by mouth 2 (two) times a day.   0    ciclopirox (LOPROX) 0.77 % Crea Apply topically 2 (two) times daily. 90 g 2    clotrimazole-betamethasone 1-0.05% (LOTRISONE) cream Apply topically once daily. 45 g 1    dicyclomine (BENTYL) 20 mg tablet Take 20 mg by mouth 2 (two) times daily.   0    fish oil-omega-3 fatty acids 300-1,000 mg capsule Take 2 g by mouth once daily.      fluorouracil (EFUDEX) 5 % cream Use hs for 2 weeks 40 g 3    ginkgo biloba leaf extract 60 mg Tab Take by mouth.      hydrALAZINE (APRESOLINE) 50 MG tablet Take 50 mg by mouth.       irbesartan (AVAPRO) 150 MG tablet Take 150 mg by mouth once daily.      LIDOcaine HCL 2% (XYLOCAINE) 2 % jelly Apply topically as needed. Apply topically once nightly to affected part of foot/feet. 30 mL 2    meclizine (ANTIVERT) 25 mg tablet       multivitamin-iron-folic acid Tab 1 capsule.      prednisolon/gatiflox/bromfenac (PREDNISOL ACE-GATIFLOX-BROMFEN) 1-0.5-0.075 % DrpS Apply 1 drop to eye 3 (three) times daily. in operative eye for 1 month after surgery 5 mL 3    sennosides 8.6 mg Cap 2 tablets.      spironolactone (ALDACTONE) 50 MG tablet Take 50 mg by mouth once daily.       TEKTURNA 150 mg Tab Take 150 mg by mouth 2 (two) times a day.       TEKTURNA 300  "mg Tab       timolol maleate 0.5% (TIMOPTIC-XE) 0.5 % SolG Place 1 drop into both eyes nightly.  0    tobramycin sulfate 0.3% (TOBREX) 0.3 % ophthalmic solution 1-2 drops topically twice daily to affected toe(s). 5 mL 3    traMADol (ULTRAM) 50 mg tablet       urea 20 % Crea Apply 1 application topically once daily. To dry skin on the feet and thick toenails. 75 g 10    furosemide (LASIX) 20 MG tablet Take 20 mg by mouth.      tazarotene (TAZORAC) 0.05 % Crea cream Apply topically once daily. Use on scalp for 2 days before treatment with light( PDT) 30 g 3     Current Facility-Administered Medications on File Prior to Visit   Medication Dose Route Frequency Provider Last Rate Last Admin    sodium chloride 0.9% flush 10 mL  10 mL Intravenous PRN Kan Kramer MD        sodium chloride 0.9% flush 10 mL  10 mL Intravenous PRN Kan Kramer MD        sodium chloride 0.9% flush 10 mL  10 mL Intravenous PRN Kan Kramer MD                Review of patient's allergies indicates:   Allergen Reactions    Aspirin Other (See Comments)     Other reaction(s): Acid reflux                   ROS:    General ROS: negative for - chills, fatigue or fever  Cardiovascular ROS: no chest pain or dyspnea on exertion  Musculoskeletal ROS: negative for - joint pain or joint stiffness   Skin: Negative for rash, Positive for nail or hair changes.  no itching.       EXAM:   Vitals:    03/06/24 1104   BP: 138/67   Pulse: 82   Weight: 81.2 kg (179 lb)   Height: 5' 2" (1.575 m)        General:  alert, no distress, cooperative    Vasc:    Pedal pulses: DP 1/4 - B/L and PT 1/4 - B/L  Capillary Refill Time: Normal - B/L  Temperature: Normal - B/L  Hair Growth: decreased - B/L  Edema:  Present bilaterally      Neuro Motor:   [place] present bilaterally,   Reflexes normal bilaterally,   Tinels absent  Mulders absent  +paresthesias (Abnormal spontaneous sensations in feet)     Derm:   Hair:  Decreased   Nails: onychomycosis of the toenails and " brittle, incurvated toenail medial border of the right hallux.  No drainage.   Interdigital Spaces: clean, dry and without evidence of break in skin integrity  Wounds: None  Skin thin and atrophic, dry on the soles.   Mild erythema dorsal left foot, no vesicles, no pruritus, no cellulitis.       Msk:    tenderness absent    masses absent  range of movement non-painful and equal  crepitation absent bilaterally, strength normal 5/5

## 2024-06-25 ENCOUNTER — OFFICE VISIT (OUTPATIENT)
Dept: PODIATRY | Facility: CLINIC | Age: 89
End: 2024-06-25
Payer: MEDICARE

## 2024-06-25 VITALS
DIASTOLIC BLOOD PRESSURE: 55 MMHG | WEIGHT: 179 LBS | BODY MASS INDEX: 32.94 KG/M2 | HEART RATE: 70 BPM | SYSTOLIC BLOOD PRESSURE: 120 MMHG | HEIGHT: 62 IN

## 2024-06-25 DIAGNOSIS — G60.9 IDIOPATHIC PERIPHERAL NEUROPATHY: ICD-10-CM

## 2024-06-25 DIAGNOSIS — R60.0 EDEMA LEG: ICD-10-CM

## 2024-06-25 DIAGNOSIS — B35.1 ONYCHOMYCOSIS WITH INGROWN TOENAIL: Primary | ICD-10-CM

## 2024-06-25 DIAGNOSIS — L60.0 ONYCHOMYCOSIS WITH INGROWN TOENAIL: Primary | ICD-10-CM

## 2024-06-25 PROCEDURE — 99999 PR PBB SHADOW E&M-EST. PATIENT-LVL III: CPT | Mod: PBBFAC,,, | Performed by: PODIATRIST

## 2024-06-25 PROCEDURE — 99213 OFFICE O/P EST LOW 20 MIN: CPT | Mod: PBBFAC,PN | Performed by: PODIATRIST

## 2024-06-25 PROCEDURE — 99499 UNLISTED E&M SERVICE: CPT | Mod: S$PBB,,, | Performed by: PODIATRIST

## 2024-06-25 PROCEDURE — 11721 DEBRIDE NAIL 6 OR MORE: CPT | Mod: Q9,PBBFAC,PN | Performed by: PODIATRIST

## 2024-06-25 NOTE — PROGRESS NOTES
"Chief Complaint   Patient presents with    Nail Care     Nail Care  Stage 3a chronic kidney disease         MEDICAL DECISION MAKING:       Problem List Items Addressed This Visit    None  Visit Diagnoses       Onychomycosis with ingrown toenail    -  Primary    Relevant Orders    Routine Foot Care    Idiopathic peripheral neuropathy        Relevant Orders    Routine Foot Care    Edema leg        Relevant Orders    Routine Foot Care              I counseled the patient on the patient's conditions, their implications and medical management.   Shoe inspection.     Maintain proper foot hygiene.   Continue wearing proper shoe gear, daily foot inspections, never walking without protective shoe gear, never putting sharp instruments to feet.  Continue topical medications to nails.  Use lotion on feet daily.  Continue Wide extra depth shoes.   Follow up 3 months foot exam or sooner if concerned.       Routine Foot Care    Date/Time: 6/25/2024 11:30 AM    Performed by: Astrid Rodriguez DPM  Authorized by: Astrid Rodriguez DPM    Time out: Immediately prior to procedure a "time out" was called to verify the correct patient, procedure, equipment, support staff and site/side marked as required.      Nail Care Type:  Debride  Location(s): All  (Left 1st Toe, Left 3rd Toe, Left 2nd Toe, Left 4th Toe, Left 5th Toe, Right 1st Toe, Right 2nd Toe, Right 3rd Toe, Right 4th Toe and Right 5th Toe)  Patient tolerance:  Patient tolerated the procedure well with no immediate complications     With patient's permission, the toenails mentioned above were reduced and debrided using a nail nipper, removing offending nail and debris. The patient will continue to monitor the areas daily, inspect the feet, wear protective shoe gear when ambulatory, and moisturizer to maintain skin integrity.             HPI:   Franklin Perez is a 89 y.o. male who presents to clinic for Nail Care (Nail Care  Stage 3a chronic kidney disease)           PCP:  Karan, " Yahir ASHLEY Jr., MD   Last encounter:  2/27/2024            Patient Active Problem List   Diagnosis    AK (actinic keratosis)    Pseudoexfoliation syndrome    Nuclear sclerotic cataract of right eye    Posterior dislocation of left lens           Current Outpatient Medications on File Prior to Visit   Medication Sig Dispense Refill    amoxicillin (AMOXIL) 500 MG capsule Take 500 mg by mouth 3 (three) times daily.      aspirin (ECOTRIN) 81 MG EC tablet Take 81 mg by mouth once daily.      BYSTOLIC 10 mg Tab Take 10 mg by mouth 2 (two) times a day.   0    ciclopirox (LOPROX) 0.77 % Crea Apply topically 2 (two) times daily. 90 g 2    clotrimazole-betamethasone 1-0.05% (LOTRISONE) cream Apply topically once daily. 45 g 1    dicyclomine (BENTYL) 20 mg tablet Take 20 mg by mouth 2 (two) times daily.   0    fish oil-omega-3 fatty acids 300-1,000 mg capsule Take 2 g by mouth once daily.      fluorouracil (EFUDEX) 5 % cream Use hs for 2 weeks 40 g 3    ginkgo biloba leaf extract 60 mg Tab Take by mouth.      hydrALAZINE (APRESOLINE) 50 MG tablet Take 50 mg by mouth.       irbesartan (AVAPRO) 150 MG tablet Take 150 mg by mouth once daily.      LIDOcaine HCL 2% (XYLOCAINE) 2 % jelly Apply topically as needed. Apply topically once nightly to affected part of foot/feet. 30 mL 2    meclizine (ANTIVERT) 25 mg tablet       multivitamin-iron-folic acid Tab 1 capsule.      prednisolon/gatiflox/bromfenac (PREDNISOL ACE-GATIFLOX-BROMFEN) 1-0.5-0.075 % DrpS Apply 1 drop to eye 3 (three) times daily. in operative eye for 1 month after surgery 5 mL 3    sennosides 8.6 mg Cap 2 tablets.      spironolactone (ALDACTONE) 50 MG tablet Take 50 mg by mouth once daily.       TEKTURNA 150 mg Tab Take 150 mg by mouth 2 (two) times a day.       TEKTURNA 300 mg Tab       timolol maleate 0.5% (TIMOPTIC-XE) 0.5 % SolG Place 1 drop into both eyes nightly.  0    tobramycin sulfate 0.3% (TOBREX) 0.3 % ophthalmic solution 1-2 drops topically twice daily to  "affected toe(s). 5 mL 3    traMADol (ULTRAM) 50 mg tablet       urea 20 % Crea Apply 1 application topically once daily. To dry skin on the feet and thick toenails. 75 g 10    furosemide (LASIX) 20 MG tablet Take 20 mg by mouth.      tazarotene (TAZORAC) 0.05 % Crea cream Apply topically once daily. Use on scalp for 2 days before treatment with light( PDT) 30 g 3     Current Facility-Administered Medications on File Prior to Visit   Medication Dose Route Frequency Provider Last Rate Last Admin    sodium chloride 0.9% flush 10 mL  10 mL Intravenous PRN Kan Kramer MD        sodium chloride 0.9% flush 10 mL  10 mL Intravenous PRN Kan Kramer MD        sodium chloride 0.9% flush 10 mL  10 mL Intravenous PRN Kan Kramer MD                Review of patient's allergies indicates:   Allergen Reactions    Aspirin Other (See Comments)     Other reaction(s): Acid reflux                   ROS:    General ROS: negative for - chills, fatigue or fever  Cardiovascular ROS: no chest pain or dyspnea on exertion  Musculoskeletal ROS: negative for - joint pain or joint stiffness   Skin: Negative for rash, Positive for nail or hair changes.  no itching.       EXAM:   Vitals:    06/25/24 1100   BP: (!) 120/55   Pulse: 70   Weight: 81.2 kg (179 lb 0.2 oz)   Height: 5' 2" (1.575 m)        General:  alert, no distress, cooperative    Vasc:    Pedal pulses: DP 1/4 - B/L and PT 1/4 - B/L  Capillary Refill Time: Normal - B/L  Temperature: Normal - B/L  Hair Growth: decreased - B/L  Edema:  Present bilaterally      Neuro Motor:   [place] present bilaterally,   Reflexes normal bilaterally,   Tinels absent  Mulders absent  +paresthesias (Abnormal spontaneous sensations in feet)     Derm:   Hair:  Decreased   Nails: onychomycosis of the toenails and brittle, incurvated toenail medial border of the right hallux.  No drainage.   Interdigital Spaces: clean, dry and without evidence of break in skin integrity  Wounds: None  Skin thin and " atrophic, dry on the soles.   Mild erythema dorsal left foot, no vesicles, no pruritus, no cellulitis.       Msk:    tenderness absent    masses absent  range of movement non-painful and equal  crepitation absent bilaterally, strength normal 5/5

## 2024-10-08 ENCOUNTER — OFFICE VISIT (OUTPATIENT)
Dept: PODIATRY | Facility: CLINIC | Age: 89
End: 2024-10-08
Payer: MEDICARE

## 2024-10-08 VITALS
DIASTOLIC BLOOD PRESSURE: 68 MMHG | HEART RATE: 80 BPM | BODY MASS INDEX: 32.94 KG/M2 | SYSTOLIC BLOOD PRESSURE: 170 MMHG | HEIGHT: 62 IN | WEIGHT: 179 LBS

## 2024-10-08 DIAGNOSIS — L60.0 ONYCHOMYCOSIS WITH INGROWN TOENAIL: Primary | ICD-10-CM

## 2024-10-08 DIAGNOSIS — G60.9 IDIOPATHIC PERIPHERAL NEUROPATHY: ICD-10-CM

## 2024-10-08 DIAGNOSIS — B35.1 ONYCHOMYCOSIS WITH INGROWN TOENAIL: Primary | ICD-10-CM

## 2024-10-08 DIAGNOSIS — L84 CORN OR CALLUS: ICD-10-CM

## 2024-10-08 PROCEDURE — 11721 DEBRIDE NAIL 6 OR MORE: CPT | Performed by: PODIATRIST

## 2024-10-08 PROCEDURE — 99213 OFFICE O/P EST LOW 20 MIN: CPT | Mod: PBBFAC,PN | Performed by: PODIATRIST

## 2024-10-08 PROCEDURE — 99999 PR PBB SHADOW E&M-EST. PATIENT-LVL III: CPT | Mod: PBBFAC,,, | Performed by: PODIATRIST

## 2024-10-08 PROCEDURE — 11056 PARNG/CUTG B9 HYPRKR LES 2-4: CPT | Mod: Q9,S$PBB,, | Performed by: PODIATRIST

## 2024-10-08 PROCEDURE — 99213 OFFICE O/P EST LOW 20 MIN: CPT | Mod: 25,S$PBB,, | Performed by: PODIATRIST

## 2024-10-14 NOTE — PROGRESS NOTES
"Chief Complaint   Patient presents with    Nail Care     Nail Care/PCP Yahir Russo Jr., MD         MEDICAL DECISION MAKING:       Problem List Items Addressed This Visit    None  Visit Diagnoses       Onychomycosis with ingrown toenail    -  Primary    Idiopathic peripheral neuropathy        Corn or callus                  I counseled the patient on the patient's conditions, their implications and medical management.   Shoe inspection.     Maintain proper foot hygiene.   Continue wearing proper shoe gear, daily foot inspections, never walking without protective shoe gear, never putting sharp instruments to feet.  Continue topical medications to nails.  Use lotion on feet daily.  Continue Wide extra depth shoes.   Follow up 3 months foot exam or sooner if concerned.   NO PCP visit in the last six months.       Routine Foot Care    Date/Time: 10/8/2024 11:00 AM    Performed by: Astrid Rodriguez DPM  Authorized by: Astrid Rodriguez DPM    Time out: Immediately prior to procedure a "time out" was called to verify the correct patient, procedure, equipment, support staff and site/side marked as required.    Consent Done?:  Yes (Verbal)  Hyperkeratotic Skin Lesions?: Yes    Number of trimmed lesions:  2  Location(s):  Left 1st Toe and Right 1st Toe    Nail Care Type:  Debride  Location(s): All  (Left 1st Toe, Left 3rd Toe, Left 2nd Toe, Left 4th Toe, Left 5th Toe, Right 1st Toe, Right 2nd Toe, Right 3rd Toe, Right 4th Toe and Right 5th Toe)  Patient tolerance:  Patient tolerated the procedure well with no immediate complications     With patient's permission, the toenails mentioned above were reduced and debrided using a nail nipper, removing offending nail and debris. The patient will continue to monitor the areas daily, inspect the feet, wear protective shoe gear when ambulatory, and moisturizer to maintain skin integrity.             HPI:   Franklin Perez is a 89 y.o. male who presents to clinic for Nail Care (Nail " Care/PCP Yahir Russo Jr., MD)           PCP:  Yahir Russo Jr., MD   Last encounter:  2/27/2024            Patient Active Problem List   Diagnosis    AK (actinic keratosis)    Pseudoexfoliation syndrome    Nuclear sclerotic cataract of right eye    Posterior dislocation of left lens           Current Outpatient Medications on File Prior to Visit   Medication Sig Dispense Refill    amoxicillin (AMOXIL) 500 MG capsule Take 500 mg by mouth 3 (three) times daily.      aspirin (ECOTRIN) 81 MG EC tablet Take 81 mg by mouth once daily.      BYSTOLIC 10 mg Tab Take 10 mg by mouth 2 (two) times a day.   0    ciclopirox (LOPROX) 0.77 % Crea Apply topically 2 (two) times daily. 90 g 2    clotrimazole-betamethasone 1-0.05% (LOTRISONE) cream Apply topically once daily. 45 g 1    dicyclomine (BENTYL) 20 mg tablet Take 20 mg by mouth 2 (two) times daily.   0    fish oil-omega-3 fatty acids 300-1,000 mg capsule Take 2 g by mouth once daily.      fluorouracil (EFUDEX) 5 % cream Use hs for 2 weeks 40 g 3    ginkgo biloba leaf extract 60 mg Tab Take by mouth.      hydrALAZINE (APRESOLINE) 50 MG tablet Take 50 mg by mouth.       irbesartan (AVAPRO) 150 MG tablet Take 150 mg by mouth once daily.      LIDOcaine HCL 2% (XYLOCAINE) 2 % jelly Apply topically as needed. Apply topically once nightly to affected part of foot/feet. 30 mL 2    meclizine (ANTIVERT) 25 mg tablet       multivitamin-iron-folic acid Tab 1 capsule.      prednisolon/gatiflox/bromfenac (PREDNISOL ACE-GATIFLOX-BROMFEN) 1-0.5-0.075 % DrpS Apply 1 drop to eye 3 (three) times daily. in operative eye for 1 month after surgery 5 mL 3    sennosides 8.6 mg Cap 2 tablets.      spironolactone (ALDACTONE) 50 MG tablet Take 50 mg by mouth once daily.       TEKTURNA 150 mg Tab Take 150 mg by mouth 2 (two) times a day.       TEKTURNA 300 mg Tab       timolol maleate 0.5% (TIMOPTIC-XE) 0.5 % SolG Place 1 drop into both eyes nightly.  0    tobramycin sulfate 0.3% (TOBREX) 0.3  "% ophthalmic solution 1-2 drops topically twice daily to affected toe(s). 5 mL 3    traMADol (ULTRAM) 50 mg tablet       urea 20 % Crea Apply 1 application topically once daily. To dry skin on the feet and thick toenails. 75 g 10    furosemide (LASIX) 20 MG tablet Take 20 mg by mouth.      tazarotene (TAZORAC) 0.05 % Crea cream Apply topically once daily. Use on scalp for 2 days before treatment with light( PDT) 30 g 3     Current Facility-Administered Medications on File Prior to Visit   Medication Dose Route Frequency Provider Last Rate Last Admin    sodium chloride 0.9% flush 10 mL  10 mL Intravenous PRN Kan Kramer MD        sodium chloride 0.9% flush 10 mL  10 mL Intravenous PRN Kan Kramer MD        sodium chloride 0.9% flush 10 mL  10 mL Intravenous PRN Kan Kramer MD                Review of patient's allergies indicates:   Allergen Reactions    Aspirin Other (See Comments)     Other reaction(s): Acid reflux                   ROS:    General ROS: negative for - chills, fatigue or fever  Cardiovascular ROS: no chest pain or dyspnea on exertion  Musculoskeletal ROS: negative for - joint pain or joint stiffness   Skin: Negative for rash, Positive for nail or hair changes.  no itching.       EXAM:   Vitals:    10/08/24 1112   BP: (!) 170/68   Pulse: 80   Weight: 81.2 kg (179 lb 0.2 oz)   Height: 5' 2" (1.575 m)        General:  alert, no distress, cooperative    Vasc:    Pedal pulses: DP 1/4 - B/L and PT 1/4 - B/L  Capillary Refill Time: Normal - B/L  Temperature: Normal - B/L  Hair Growth: decreased - B/L  Edema:  Present bilaterally      Neuro Motor:   [place] present bilaterally,   Reflexes normal bilaterally,   Tinels absent  Mulders absent  +paresthesias (Abnormal spontaneous sensations in feet)     Derm:   Hair:  Decreased   Nails: onychomycosis of the toenails and brittle, incurvated toenail medial border of the right hallux.  No drainage.   Interdigital Spaces: clean, dry and without evidence " of break in skin integrity  Wounds: None  Skin thin and atrophic, dry on the soles.   Mild erythema dorsal left foot, no vesicles, no pruritus, no cellulitis.       Msk:    tenderness absent    masses absent  range of movement non-painful and equal  crepitation absent bilaterally, strength normal 5/5

## 2024-12-17 ENCOUNTER — OFFICE VISIT (OUTPATIENT)
Dept: PODIATRY | Facility: CLINIC | Age: 89
End: 2024-12-17
Payer: MEDICARE

## 2024-12-17 VITALS
HEART RATE: 80 BPM | WEIGHT: 179 LBS | BODY MASS INDEX: 32.94 KG/M2 | SYSTOLIC BLOOD PRESSURE: 137 MMHG | DIASTOLIC BLOOD PRESSURE: 69 MMHG | HEIGHT: 62 IN

## 2024-12-17 DIAGNOSIS — M79.672 BILATERAL FOOT PAIN: ICD-10-CM

## 2024-12-17 DIAGNOSIS — G60.9 IDIOPATHIC PERIPHERAL NEUROPATHY: ICD-10-CM

## 2024-12-17 DIAGNOSIS — B35.1 DERMATOPHYTOSIS OF NAIL: Primary | ICD-10-CM

## 2024-12-17 DIAGNOSIS — M79.671 BILATERAL FOOT PAIN: ICD-10-CM

## 2024-12-17 PROCEDURE — 99213 OFFICE O/P EST LOW 20 MIN: CPT | Mod: PBBFAC,PN | Performed by: PODIATRIST

## 2024-12-17 PROCEDURE — 99999 PR PBB SHADOW E&M-EST. PATIENT-LVL III: CPT | Mod: PBBFAC,,, | Performed by: PODIATRIST

## 2024-12-17 PROCEDURE — 11721 DEBRIDE NAIL 6 OR MORE: CPT | Mod: PBBFAC,PN | Performed by: PODIATRIST

## 2024-12-17 NOTE — PROGRESS NOTES
"Chief Complaint   Patient presents with    Diabetic Foot Exam     Foot Exam/PCP Yahir Russo Jr., MD  02/27/24         MEDICAL DECISION MAKING:       Problem List Items Addressed This Visit    None  Visit Diagnoses       Dermatophytosis of nail    -  Primary    Relevant Orders    Routine Foot Care (Completed)    Idiopathic peripheral neuropathy        Relevant Orders    Routine Foot Care (Completed)    Bilateral foot pain                    I counseled the patient on the patient's conditions, their implications and medical management.   Shoe inspection.     Maintain proper foot hygiene.   Continue wearing proper shoe gear, daily foot inspections, never walking without protective shoe gear, never putting sharp instruments to feet.  Continue topical medications to nails.  Use lotion on feet daily.  Continue Wide extra depth shoes.   Follow up 3 months foot exam or sooner if concerned.   Medical interpretation during encounter provided by [facility]   NO PCP visit in the last six months.       Routine Foot Care    Date/Time: 12/17/2024 11:00 AM    Performed by: Astrid Rodriguez DPM  Authorized by: Astrid Rodriguez DPM    Time out: Immediately prior to procedure a "time out" was called to verify the correct patient, procedure, equipment, support staff and site/side marked as required.    Consent Done?:  Yes (Verbal)    Nail Care Type:  Debride  Location(s): All  (Left 1st Toe, Left 3rd Toe, Left 2nd Toe, Left 4th Toe, Left 5th Toe, Right 1st Toe, Right 2nd Toe, Right 3rd Toe, Right 4th Toe and Right 5th Toe)  Patient tolerance:  Patient tolerated the procedure well with no immediate complications     With patient's permission, the toenails mentioned above were reduced and debrided using a nail nipper, removing offending nail and debris. The patient will continue to monitor the areas daily, inspect the feet, wear protective shoe gear when ambulatory, and moisturizer to maintain skin integrity. "             HPI:   Franklin Perez is a 89 y.o. male who presents to clinic for Diabetic Foot Exam (Foot Exam/PCP Yahir Russo Jr., MD  02/27/24)           PCP:  Yahir Russo Jr., MD   Last encounter:  2/27/2024            Patient Active Problem List   Diagnosis    AK (actinic keratosis)    Pseudoexfoliation syndrome    Nuclear sclerotic cataract of right eye    Posterior dislocation of left lens           Current Outpatient Medications on File Prior to Visit   Medication Sig Dispense Refill    amoxicillin (AMOXIL) 500 MG capsule Take 500 mg by mouth 3 (three) times daily.      aspirin (ECOTRIN) 81 MG EC tablet Take 81 mg by mouth once daily.      BYSTOLIC 10 mg Tab Take 10 mg by mouth 2 (two) times a day.   0    ciclopirox (LOPROX) 0.77 % Crea Apply topically 2 (two) times daily. 90 g 2    clotrimazole-betamethasone 1-0.05% (LOTRISONE) cream Apply topically once daily. 45 g 1    dicyclomine (BENTYL) 20 mg tablet Take 20 mg by mouth 2 (two) times daily.   0    fish oil-omega-3 fatty acids 300-1,000 mg capsule Take 2 g by mouth once daily.      fluorouracil (EFUDEX) 5 % cream Use hs for 2 weeks 40 g 3    ginkgo biloba leaf extract 60 mg Tab Take by mouth.      hydrALAZINE (APRESOLINE) 50 MG tablet Take 50 mg by mouth.       irbesartan (AVAPRO) 150 MG tablet Take 150 mg by mouth once daily.      LIDOcaine HCL 2% (XYLOCAINE) 2 % jelly Apply topically as needed. Apply topically once nightly to affected part of foot/feet. 30 mL 2    meclizine (ANTIVERT) 25 mg tablet       multivitamin-iron-folic acid Tab 1 capsule.      prednisolon/gatiflox/bromfenac (PREDNISOL ACE-GATIFLOX-BROMFEN) 1-0.5-0.075 % DrpS Apply 1 drop to eye 3 (three) times daily. in operative eye for 1 month after surgery 5 mL 3    sennosides 8.6 mg Cap 2 tablets.      spironolactone (ALDACTONE) 50 MG tablet Take 50 mg by mouth once daily.       TEKTURNA 150 mg Tab Take 150 mg by mouth 2 (two) times a day.       TEKTURNA 300 mg Tab       timolol  "maleate 0.5% (TIMOPTIC-XE) 0.5 % SolG Place 1 drop into both eyes nightly.  0    tobramycin sulfate 0.3% (TOBREX) 0.3 % ophthalmic solution 1-2 drops topically twice daily to affected toe(s). 5 mL 3    traMADol (ULTRAM) 50 mg tablet       urea 20 % Crea Apply 1 application topically once daily. To dry skin on the feet and thick toenails. 75 g 10    furosemide (LASIX) 20 MG tablet Take 20 mg by mouth.      tazarotene (TAZORAC) 0.05 % Crea cream Apply topically once daily. Use on scalp for 2 days before treatment with light( PDT) 30 g 3     Current Facility-Administered Medications on File Prior to Visit   Medication Dose Route Frequency Provider Last Rate Last Admin    sodium chloride 0.9% flush 10 mL  10 mL Intravenous PRN Kan Kramer MD        sodium chloride 0.9% flush 10 mL  10 mL Intravenous PRN Kan Kramer MD        sodium chloride 0.9% flush 10 mL  10 mL Intravenous PRN Kan Kramer MD                Review of patient's allergies indicates:   Allergen Reactions    Aspirin Other (See Comments)     Other reaction(s): Acid reflux                   ROS:    General ROS: negative for - chills, fatigue or fever  Cardiovascular ROS: no chest pain or dyspnea on exertion  Musculoskeletal ROS: negative for - joint pain or joint stiffness   Skin: Negative for rash, Positive for nail or hair changes.  no itching.       EXAM:   Vitals:    12/17/24 1059   BP: 137/69   Pulse: 80   Weight: 81.2 kg (179 lb 0.2 oz)   Height: 5' 2" (1.575 m)        General:  alert, no distress, cooperative    Vasc:    Pedal pulses: DP 1/4 - B/L and PT 1/4 - B/L  Capillary Refill Time: Normal - B/L  Temperature: Normal - B/L  Hair Growth: decreased - B/L  Edema:  Present bilaterally      Neuro Motor:   [place] present bilaterally,   Reflexes normal bilaterally,   Tinels absent  Mulders absent  +paresthesias (Abnormal spontaneous sensations in feet)     Derm:   Hair:  Decreased   Nails: onychomycosis of the toenails and brittle, " incurvated toenail medial border of the right hallux.  No drainage.   Interdigital Spaces: clean, dry and without evidence of break in skin integrity  Wounds: None  Skin thin and atrophic, dry on the soles.   Mild erythema dorsal left foot, no vesicles, no pruritus, no cellulitis.       Msk:    tenderness absent    masses absent  range of movement non-painful and equal  crepitation absent bilaterally, strength normal 5/5

## 2025-04-02 ENCOUNTER — OFFICE VISIT (OUTPATIENT)
Dept: PODIATRY | Facility: CLINIC | Age: OVER 89
End: 2025-04-02
Payer: MEDICAID

## 2025-04-02 VITALS
HEART RATE: 86 BPM | HEIGHT: 62 IN | SYSTOLIC BLOOD PRESSURE: 146 MMHG | WEIGHT: 179 LBS | DIASTOLIC BLOOD PRESSURE: 71 MMHG | BODY MASS INDEX: 32.94 KG/M2

## 2025-04-02 DIAGNOSIS — B35.1 ONYCHOMYCOSIS WITH INGROWN TOENAIL: ICD-10-CM

## 2025-04-02 DIAGNOSIS — R21 RASH OF FOOT: Primary | Chronic | ICD-10-CM

## 2025-04-02 DIAGNOSIS — L60.0 ONYCHOMYCOSIS WITH INGROWN TOENAIL: ICD-10-CM

## 2025-04-02 PROCEDURE — 99999 PR PBB SHADOW E&M-EST. PATIENT-LVL III: CPT | Mod: PBBFAC,,, | Performed by: PODIATRIST

## 2025-04-02 PROCEDURE — 17999 UNLISTD PX SKN MUC MEMB SUBQ: CPT | Mod: CSM,,, | Performed by: PODIATRIST

## 2025-04-02 PROCEDURE — 99213 OFFICE O/P EST LOW 20 MIN: CPT | Mod: PBBFAC,PN | Performed by: PODIATRIST

## 2025-04-02 RX ORDER — TRIAMCINOLONE ACETONIDE 0.25 MG/G
CREAM TOPICAL DAILY
Qty: 60 G | Refills: 0 | Status: SHIPPED | OUTPATIENT
Start: 2025-04-02

## 2025-04-03 NOTE — PROGRESS NOTES
Chief Complaint   Patient presents with    Diabetic Foot Exam     Nail Care/PCP Karan  02/27/24           MEDICAL DECISION MAKING:     Problem List Items Addressed This Visit    None  Visit Diagnoses         Rash of foot    -  Primary    Relevant Medications    triamcinolone acetonide 0.025% (KENALOG) 0.025 % cream      Onychomycosis with ingrown toenail                    I counseled the patient on the patient's conditions, their implications and medical management.   Shoe inspection.     Maintain proper foot hygiene.   Continue wearing proper shoe gear, daily foot inspections, never walking without protective shoe gear, never putting sharp instruments to feet.  Continue topical medications to nails.  Use lotion on feet daily.  Continue Wide extra depth shoes.   Follow up 3 months foot exam or sooner if concerned.     NO PCP visit in the last six months.       Procedure Brief $80 today as does not meet criteria for routine covered foot care.   Medical interpretation during encounter provided by [facility]              HPI:   Franklin Perez is a 89 y.o. male who presents to clinic for Diabetic Foot Exam (Nail Care/PCP Karan  02/27/24)           PCP:  Yaihr Russo Jr., MD   Last encounter:  2/27/2024            Patient Active Problem List   Diagnosis    AK (actinic keratosis)    Pseudoexfoliation syndrome    Nuclear sclerotic cataract of right eye    Posterior dislocation of left lens           Current Outpatient Medications on File Prior to Visit   Medication Sig Dispense Refill    amoxicillin (AMOXIL) 500 MG capsule Take 500 mg by mouth 3 (three) times daily.      aspirin (ECOTRIN) 81 MG EC tablet Take 81 mg by mouth once daily.      BYSTOLIC 10 mg Tab Take 10 mg by mouth 2 (two) times a day.   0    ciclopirox (LOPROX) 0.77 % Crea Apply topically 2 (two) times daily. 90 g 2    clotrimazole-betamethasone 1-0.05% (LOTRISONE) cream Apply topically once daily. 45 g 1    dicyclomine (BENTYL) 20 mg  tablet Take 20 mg by mouth 2 (two) times daily.   0    fish oil-omega-3 fatty acids 300-1,000 mg capsule Take 2 g by mouth once daily.      fluorouracil (EFUDEX) 5 % cream Use hs for 2 weeks 40 g 3    ginkgo biloba leaf extract 60 mg Tab Take by mouth.      hydrALAZINE (APRESOLINE) 50 MG tablet Take 50 mg by mouth.       irbesartan (AVAPRO) 150 MG tablet Take 150 mg by mouth once daily.      LIDOcaine HCL 2% (XYLOCAINE) 2 % jelly Apply topically as needed. Apply topically once nightly to affected part of foot/feet. 30 mL 2    meclizine (ANTIVERT) 25 mg tablet       multivitamin-iron-folic acid Tab 1 capsule.      prednisolon/gatiflox/bromfenac (PREDNISOL ACE-GATIFLOX-BROMFEN) 1-0.5-0.075 % DrpS Apply 1 drop to eye 3 (three) times daily. in operative eye for 1 month after surgery 5 mL 3    sennosides 8.6 mg Cap 2 tablets.      spironolactone (ALDACTONE) 50 MG tablet Take 50 mg by mouth once daily.       TEKTURNA 150 mg Tab Take 150 mg by mouth 2 (two) times a day.       TEKTURNA 300 mg Tab       timolol maleate 0.5% (TIMOPTIC-XE) 0.5 % SolG Place 1 drop into both eyes nightly.  0    tobramycin sulfate 0.3% (TOBREX) 0.3 % ophthalmic solution 1-2 drops topically twice daily to affected toe(s). 5 mL 3    traMADol (ULTRAM) 50 mg tablet       urea 20 % Crea Apply 1 application topically once daily. To dry skin on the feet and thick toenails. 75 g 10    furosemide (LASIX) 20 MG tablet Take 20 mg by mouth.      tazarotene (TAZORAC) 0.05 % Crea cream Apply topically once daily. Use on scalp for 2 days before treatment with light( PDT) 30 g 3     Current Facility-Administered Medications on File Prior to Visit   Medication Dose Route Frequency Provider Last Rate Last Admin    sodium chloride 0.9% flush 10 mL  10 mL Intravenous PRN Kan Kramer MD        sodium chloride 0.9% flush 10 mL  10 mL Intravenous PRN Kan Kramer MD        sodium chloride 0.9% flush 10 mL  10 mL Intravenous PRN Kan Kramer MD         "        Review of patient's allergies indicates:   Allergen Reactions    Aspirin Other (See Comments)     Other reaction(s): Acid reflux                   ROS:    General ROS: negative for - chills, fatigue or fever  Cardiovascular ROS: no chest pain or dyspnea on exertion  Musculoskeletal ROS: negative for - joint pain or joint stiffness   Skin: Negative for rash, Positive for nail or hair changes.  no itching.       EXAM:   Vitals:    04/02/25 1108   BP: (!) 146/71   Pulse: 86   Weight: 81.2 kg (179 lb 0.2 oz)   Height: 5' 2" (1.575 m)        General:  alert, no distress, cooperative    Vasc:    Pedal pulses: DP 1/4 - B/L and PT 1/4 - B/L  Capillary Refill Time: Normal - B/L  Temperature: Normal - B/L  Hair Growth: decreased - B/L  Edema:  Present bilaterally      Neuro Motor:   [place] present bilaterally,   Reflexes normal bilaterally,   Tinels absent  Mulders absent  +paresthesias (Abnormal spontaneous sensations in feet)     Derm:   Hair:  Decreased   Nails: onychomycosis of the toenails and brittle, incurvated toenail medial border of the right hallux.  No drainage.   Interdigital Spaces: clean, dry and without evidence of break in skin integrity  Wounds: None  Skin thin and atrophic, dry on the soles.   Mild erythema dorsal left foot, no vesicles, no pruritus, no cellulitis.       Msk:    tenderness absent    masses absent  range of movement non-painful and equal  crepitation absent bilaterally, strength normal 5/5    "

## 2025-07-02 ENCOUNTER — OFFICE VISIT (OUTPATIENT)
Dept: PODIATRY | Facility: CLINIC | Age: OVER 89
End: 2025-07-02
Payer: MEDICARE

## 2025-07-02 VITALS
SYSTOLIC BLOOD PRESSURE: 139 MMHG | BODY MASS INDEX: 32.94 KG/M2 | DIASTOLIC BLOOD PRESSURE: 54 MMHG | HEART RATE: 89 BPM | HEIGHT: 62 IN | WEIGHT: 179 LBS

## 2025-07-02 DIAGNOSIS — M79.672 BILATERAL FOOT PAIN: Primary | ICD-10-CM

## 2025-07-02 DIAGNOSIS — M79.671 BILATERAL FOOT PAIN: Primary | ICD-10-CM

## 2025-07-02 DIAGNOSIS — L60.0 ONYCHOMYCOSIS WITH INGROWN TOENAIL: ICD-10-CM

## 2025-07-02 DIAGNOSIS — B35.1 ONYCHOMYCOSIS WITH INGROWN TOENAIL: ICD-10-CM

## 2025-07-02 DIAGNOSIS — G60.9 IDIOPATHIC PERIPHERAL NEUROPATHY: ICD-10-CM

## 2025-07-02 PROCEDURE — 99213 OFFICE O/P EST LOW 20 MIN: CPT | Mod: PBBFAC,PN | Performed by: PODIATRIST

## 2025-07-02 PROCEDURE — 99999 PR PBB SHADOW E&M-EST. PATIENT-LVL III: CPT | Mod: PBBFAC,,, | Performed by: PODIATRIST

## 2025-07-02 PROCEDURE — 11721 DEBRIDE NAIL 6 OR MORE: CPT | Mod: Q9,PBBFAC,PN | Performed by: PODIATRIST

## 2025-07-02 NOTE — PROGRESS NOTES
"Chief Complaint   Patient presents with    Nail Care     Nail Care/PCP Karan  02/27/24           MEDICAL DECISION MAKING:     Problem List Items Addressed This Visit    None  Visit Diagnoses         Bilateral foot pain    -  Primary      Idiopathic peripheral neuropathy          Onychomycosis with ingrown toenail                    I counseled the patient on the patient's conditions, their implications and medical management.   Shoe inspection.     Maintain proper foot hygiene.   Continue wearing proper shoe gear, daily foot inspections, never walking without protective shoe gear, never putting sharp instruments to feet.  Continue topical medications to nails.  Use lotion on feet daily.  Continue Wide extra depth shoes.   Follow up 3 months foot exam or sooner if concerned.      General nail care measures for abnormal nails include:  Keeping nails trimmed short, but avoid overzealous trimming  Avoiding trauma   Avoiding contact irritants   Keeping nails dry (avoiding wet work)  Avoiding all nail cosmetics  Wearing shoes that fit well at the toe box.  Avoid tight shoes.         Routine Foot Care    Date/Time: 7/2/2025 11:00 AM    Performed by: Astrid Rodriguez DPM  Authorized by: Astrid Rodriguez DPM    Time out: Immediately prior to procedure a "time out" was called to verify the correct patient, procedure, equipment, support staff and site/side marked as required.      Nail Care Type:  Debride  Location(s): All  (Left 1st Toe, Left 3rd Toe, Left 2nd Toe, Left 4th Toe, Left 5th Toe, Right 1st Toe, Right 2nd Toe, Right 3rd Toe, Right 4th Toe and Right 5th Toe)  Patient tolerance:  Patient tolerated the procedure well with no immediate complications     With patient's permission, the toenails mentioned above were reduced and debrided using a nail nipper, removing offending nail and debris. The patient will continue to monitor the areas daily, inspect the feet, wear protective shoe gear when ambulatory, and moisturizer to " maintain skin integrity.             HPI:   Franklin Perez is a 89 y.o. male who presents to clinic for Nail Care (Nail Care/PCP Karan  02/27/24)           PCP:  Yahir Russo Jr., MD   Last encounter:  2/27/2024            Patient Active Problem List   Diagnosis    AK (actinic keratosis)    Pseudoexfoliation syndrome    Nuclear sclerotic cataract of right eye    Posterior dislocation of left lens           Current Outpatient Medications on File Prior to Visit   Medication Sig Dispense Refill    amoxicillin (AMOXIL) 500 MG capsule Take 500 mg by mouth 3 (three) times daily.      aspirin (ECOTRIN) 81 MG EC tablet Take 81 mg by mouth once daily.      BYSTOLIC 10 mg Tab Take 10 mg by mouth 2 (two) times a day.   0    ciclopirox (LOPROX) 0.77 % Crea Apply topically 2 (two) times daily. 90 g 2    clotrimazole-betamethasone 1-0.05% (LOTRISONE) cream Apply topically once daily. 45 g 1    dicyclomine (BENTYL) 20 mg tablet Take 20 mg by mouth 2 (two) times daily.   0    fish oil-omega-3 fatty acids 300-1,000 mg capsule Take 2 g by mouth once daily.      fluorouracil (EFUDEX) 5 % cream Use hs for 2 weeks 40 g 3    ginkgo biloba leaf extract 60 mg Tab Take by mouth.      hydrALAZINE (APRESOLINE) 50 MG tablet Take 50 mg by mouth.       irbesartan (AVAPRO) 150 MG tablet Take 150 mg by mouth once daily.      LIDOcaine HCL 2% (XYLOCAINE) 2 % jelly Apply topically as needed. Apply topically once nightly to affected part of foot/feet. 30 mL 2    meclizine (ANTIVERT) 25 mg tablet       multivitamin-iron-folic acid Tab 1 capsule.      prednisolon/gatiflox/bromfenac (PREDNISOL ACE-GATIFLOX-BROMFEN) 1-0.5-0.075 % DrpS Apply 1 drop to eye 3 (three) times daily. in operative eye for 1 month after surgery 5 mL 3    sennosides 8.6 mg Cap 2 tablets.      spironolactone (ALDACTONE) 50 MG tablet Take 50 mg by mouth once daily.       TEKTURNA 150 mg Tab Take 150 mg by mouth 2 (two) times a day.       TEKTURNA 300 mg Tab       timolol  "maleate 0.5% (TIMOPTIC-XE) 0.5 % SolG Place 1 drop into both eyes nightly.  0    tobramycin sulfate 0.3% (TOBREX) 0.3 % ophthalmic solution 1-2 drops topically twice daily to affected toe(s). 5 mL 3    traMADol (ULTRAM) 50 mg tablet       triamcinolone acetonide 0.025% (KENALOG) 0.025 % cream Apply topically once daily. To top of the left foot 60 g 0    urea 20 % Crea Apply 1 application topically once daily. To dry skin on the feet and thick toenails. 75 g 10    furosemide (LASIX) 20 MG tablet Take 20 mg by mouth.      tazarotene (TAZORAC) 0.05 % Crea cream Apply topically once daily. Use on scalp for 2 days before treatment with light( PDT) 30 g 3     Current Facility-Administered Medications on File Prior to Visit   Medication Dose Route Frequency Provider Last Rate Last Admin    sodium chloride 0.9% flush 10 mL  10 mL Intravenous PRN Kan Kramer MD        sodium chloride 0.9% flush 10 mL  10 mL Intravenous PRN Kan Kramer MD        sodium chloride 0.9% flush 10 mL  10 mL Intravenous PRN Kan Kramer MD                Review of patient's allergies indicates:   Allergen Reactions    Aspirin Other (See Comments)     Other reaction(s): Acid reflux                   ROS:    General ROS: negative for - chills, fatigue or fever  Cardiovascular ROS: no chest pain or dyspnea on exertion  Musculoskeletal ROS: negative for - joint pain or joint stiffness   Skin: Negative for rash, Positive for nail or hair changes.  no itching.       EXAM:   Vitals:    07/02/25 1108   BP: (!) 139/54   Pulse: 89   Weight: 81.2 kg (179 lb 0.2 oz)   Height: 5' 2" (1.575 m)        General:  alert, no distress, cooperative    Vasc:    Pedal pulses: DP 1/4 - B/L and PT 1/4 - B/L  Capillary Refill Time: Normal - B/L  Temperature: Normal - B/L  Hair Growth: decreased - B/L  Edema:  Present bilaterally      Neuro Motor:   [place] present bilaterally,   Reflexes normal bilaterally,   Tinels absent  Mulders absent  +paresthesias (Abnormal " spontaneous sensations in feet)     Derm:   Hair:  Decreased   Nails: onychomycosis of the toenails and brittle, incurvated toenail medial border of the right hallux.  No drainage.   Interdigital Spaces: clean, dry and without evidence of break in skin integrity  Wounds: None  Skin thin and atrophic, dry on the soles.   Mild erythema dorsal left foot, no vesicles, no pruritus, no cellulitis.       Msk:    tenderness absent    masses absent  range of movement non-painful and equal  crepitation absent bilaterally, strength normal 5/5

## (undated) DEVICE — SOL BETADINE 5%

## (undated) DEVICE — SKINMARKER & RULER REGULAR X-F

## (undated) DEVICE — NDL HYPO A BEVEL 30X1/2

## (undated) DEVICE — SYS VLV ENTRY 23G 3-COUNT 4MM

## (undated) DEVICE — SUT 10/0 1X12IN BLK TG160-6

## (undated) DEVICE — EXPANDER PUPIL 7.0MM

## (undated) DEVICE — CAUTERY VARIABLE LOW TEMP

## (undated) DEVICE — GLOVE BIOGEL SKINSENSE PI 7.5

## (undated) DEVICE — SYR SLIP TIP 1CC

## (undated) DEVICE — Device

## (undated) DEVICE — SOL WATER STRL IRR 1000ML

## (undated) DEVICE — SHIELD EYE PLASTIC 3100G

## (undated) DEVICE — CANNULA IRR ANTERIOR 27GX4MM

## (undated) DEVICE — PENCIL BIPOLAR 18G STR 2 PIN

## (undated) DEVICE — CASSETTE INFINITI

## (undated) DEVICE — SUT 8-0 8 COATED VICRYL VI

## (undated) DEVICE — CORD FOR BIPOLAR FORCEPS 12

## (undated) DEVICE — DRESSING EYE OVAL LF

## (undated) DEVICE — PROBE VITRECTOMY CENTURION 23G

## (undated) DEVICE — GLASSES EYE PROTECTIVE

## (undated) DEVICE — FORCEP GRIESHABER MAXGRIP 25G

## (undated) DEVICE — SOL PVP-I SCRUB 7.5% 4OZ